# Patient Record
Sex: FEMALE | Race: WHITE | NOT HISPANIC OR LATINO | URBAN - METROPOLITAN AREA
[De-identification: names, ages, dates, MRNs, and addresses within clinical notes are randomized per-mention and may not be internally consistent; named-entity substitution may affect disease eponyms.]

---

## 2021-03-03 ENCOUNTER — INPATIENT (INPATIENT)
Facility: HOSPITAL | Age: 66
LOS: 8 days | Discharge: NURSING, REHAB OR LONG-TERM CARE | End: 2021-03-12
Attending: STUDENT IN AN ORGANIZED HEALTH CARE EDUCATION/TRAINING PROGRAM | Admitting: STUDENT IN AN ORGANIZED HEALTH CARE EDUCATION/TRAINING PROGRAM
Payer: COMMERCIAL

## 2021-03-03 VITALS
OXYGEN SATURATION: 98 % | RESPIRATION RATE: 16 BRPM | SYSTOLIC BLOOD PRESSURE: 158 MMHG | HEART RATE: 124 BPM | DIASTOLIC BLOOD PRESSURE: 108 MMHG | TEMPERATURE: 98 F

## 2021-03-03 DIAGNOSIS — R53.1 WEAKNESS: ICD-10-CM

## 2021-03-03 DIAGNOSIS — I10 ESSENTIAL (PRIMARY) HYPERTENSION: ICD-10-CM

## 2021-03-03 DIAGNOSIS — E66.9 OBESITY, UNSPECIFIED: ICD-10-CM

## 2021-03-03 DIAGNOSIS — F32.9 MAJOR DEPRESSIVE DISORDER, SINGLE EPISODE, UNSPECIFIED: ICD-10-CM

## 2021-03-03 DIAGNOSIS — F10.10 ALCOHOL ABUSE, UNCOMPLICATED: ICD-10-CM

## 2021-03-03 DIAGNOSIS — D53.9 NUTRITIONAL ANEMIA, UNSPECIFIED: ICD-10-CM

## 2021-03-03 DIAGNOSIS — F41.9 ANXIETY DISORDER, UNSPECIFIED: ICD-10-CM

## 2021-03-03 DIAGNOSIS — Z02.9 ENCOUNTER FOR ADMINISTRATIVE EXAMINATIONS, UNSPECIFIED: ICD-10-CM

## 2021-03-03 LAB
ALBUMIN SERPL ELPH-MCNC: 3 G/DL — LOW (ref 3.3–5)
ALP SERPL-CCNC: 93 U/L — SIGNIFICANT CHANGE UP (ref 40–120)
ALT FLD-CCNC: 11 U/L — SIGNIFICANT CHANGE UP (ref 4–33)
ANION GAP SERPL CALC-SCNC: 14 MMOL/L — SIGNIFICANT CHANGE UP (ref 7–14)
AST SERPL-CCNC: 21 U/L — SIGNIFICANT CHANGE UP (ref 4–32)
BASOPHILS # BLD AUTO: 0.05 K/UL — SIGNIFICANT CHANGE UP (ref 0–0.2)
BASOPHILS NFR BLD AUTO: 0.5 % — SIGNIFICANT CHANGE UP (ref 0–2)
BILIRUB SERPL-MCNC: 0.3 MG/DL — SIGNIFICANT CHANGE UP (ref 0.2–1.2)
BUN SERPL-MCNC: 12 MG/DL — SIGNIFICANT CHANGE UP (ref 7–23)
CALCIUM SERPL-MCNC: 9.1 MG/DL — SIGNIFICANT CHANGE UP (ref 8.4–10.5)
CHLORIDE SERPL-SCNC: 103 MMOL/L — SIGNIFICANT CHANGE UP (ref 98–107)
CO2 SERPL-SCNC: 25 MMOL/L — SIGNIFICANT CHANGE UP (ref 22–31)
CREAT SERPL-MCNC: 0.49 MG/DL — LOW (ref 0.5–1.3)
CRP SERPL-MCNC: 40.5 MG/L — HIGH
EOSINOPHIL # BLD AUTO: 0.15 K/UL — SIGNIFICANT CHANGE UP (ref 0–0.5)
EOSINOPHIL NFR BLD AUTO: 1.5 % — SIGNIFICANT CHANGE UP (ref 0–6)
ERYTHROCYTE [SEDIMENTATION RATE] IN BLOOD: 77 MM/HR — HIGH (ref 4–25)
GLUCOSE SERPL-MCNC: 96 MG/DL — SIGNIFICANT CHANGE UP (ref 70–99)
HCT VFR BLD CALC: 40.8 % — SIGNIFICANT CHANGE UP (ref 34.5–45)
HGB BLD-MCNC: 13.3 G/DL — SIGNIFICANT CHANGE UP (ref 11.5–15.5)
IANC: 6.55 K/UL — SIGNIFICANT CHANGE UP (ref 1.5–8.5)
IMM GRANULOCYTES NFR BLD AUTO: 0.3 % — SIGNIFICANT CHANGE UP (ref 0–1.5)
LYMPHOCYTES # BLD AUTO: 2.87 K/UL — SIGNIFICANT CHANGE UP (ref 1–3.3)
LYMPHOCYTES # BLD AUTO: 28 % — SIGNIFICANT CHANGE UP (ref 13–44)
MCHC RBC-ENTMCNC: 32.6 GM/DL — SIGNIFICANT CHANGE UP (ref 32–36)
MCHC RBC-ENTMCNC: 35.4 PG — HIGH (ref 27–34)
MCV RBC AUTO: 108.5 FL — HIGH (ref 80–100)
MONOCYTES # BLD AUTO: 0.6 K/UL — SIGNIFICANT CHANGE UP (ref 0–0.9)
MONOCYTES NFR BLD AUTO: 5.9 % — SIGNIFICANT CHANGE UP (ref 2–14)
NEUTROPHILS # BLD AUTO: 6.55 K/UL — SIGNIFICANT CHANGE UP (ref 1.8–7.4)
NEUTROPHILS NFR BLD AUTO: 63.8 % — SIGNIFICANT CHANGE UP (ref 43–77)
NRBC # BLD: 0 /100 WBCS — SIGNIFICANT CHANGE UP
NRBC # FLD: 0 K/UL — SIGNIFICANT CHANGE UP
PLATELET # BLD AUTO: 413 K/UL — HIGH (ref 150–400)
POTASSIUM SERPL-MCNC: 3.2 MMOL/L — LOW (ref 3.5–5.3)
POTASSIUM SERPL-SCNC: 3.2 MMOL/L — LOW (ref 3.5–5.3)
PROT SERPL-MCNC: 6.5 G/DL — SIGNIFICANT CHANGE UP (ref 6–8.3)
RBC # BLD: 3.76 M/UL — LOW (ref 3.8–5.2)
RBC # FLD: 16.7 % — HIGH (ref 10.3–14.5)
SARS-COV-2 RNA SPEC QL NAA+PROBE: SIGNIFICANT CHANGE UP
SODIUM SERPL-SCNC: 142 MMOL/L — SIGNIFICANT CHANGE UP (ref 135–145)
TSH SERPL-MCNC: 2.1 UIU/ML — SIGNIFICANT CHANGE UP (ref 0.27–4.2)
VIT B12 SERPL-MCNC: 260 PG/ML — SIGNIFICANT CHANGE UP (ref 200–900)
WBC # BLD: 10.25 K/UL — SIGNIFICANT CHANGE UP (ref 3.8–10.5)
WBC # FLD AUTO: 10.25 K/UL — SIGNIFICANT CHANGE UP (ref 3.8–10.5)

## 2021-03-03 PROCEDURE — 99285 EMERGENCY DEPT VISIT HI MDM: CPT

## 2021-03-03 PROCEDURE — 99223 1ST HOSP IP/OBS HIGH 75: CPT | Mod: GC

## 2021-03-03 RX ORDER — SODIUM CHLORIDE 9 MG/ML
1000 INJECTION, SOLUTION INTRAVENOUS ONCE
Refills: 0 | Status: COMPLETED | OUTPATIENT
Start: 2021-03-03 | End: 2021-03-03

## 2021-03-03 RX ORDER — ENOXAPARIN SODIUM 100 MG/ML
40 INJECTION SUBCUTANEOUS DAILY
Refills: 0 | Status: DISCONTINUED | OUTPATIENT
Start: 2021-03-03 | End: 2021-03-09

## 2021-03-03 RX ORDER — THIAMINE MONONITRATE (VIT B1) 100 MG
100 TABLET ORAL ONCE
Refills: 0 | Status: COMPLETED | OUTPATIENT
Start: 2021-03-03 | End: 2021-03-03

## 2021-03-03 RX ORDER — FOLIC ACID 0.8 MG
1 TABLET ORAL ONCE
Refills: 0 | Status: COMPLETED | OUTPATIENT
Start: 2021-03-03 | End: 2021-03-03

## 2021-03-03 RX ORDER — LANOLIN ALCOHOL/MO/W.PET/CERES
6 CREAM (GRAM) TOPICAL AT BEDTIME
Refills: 0 | Status: DISCONTINUED | OUTPATIENT
Start: 2021-03-03 | End: 2021-03-12

## 2021-03-03 RX ORDER — FLUOXETINE HCL 10 MG
1 CAPSULE ORAL
Qty: 0 | Refills: 0 | DISCHARGE

## 2021-03-03 RX ORDER — THIAMINE MONONITRATE (VIT B1) 100 MG
100 TABLET ORAL DAILY
Refills: 0 | Status: DISCONTINUED | OUTPATIENT
Start: 2021-03-03 | End: 2021-03-03

## 2021-03-03 RX ORDER — THIAMINE MONONITRATE (VIT B1) 100 MG
500 TABLET ORAL THREE TIMES A DAY
Refills: 0 | Status: COMPLETED | OUTPATIENT
Start: 2021-03-03 | End: 2021-03-06

## 2021-03-03 RX ORDER — LANOLIN ALCOHOL/MO/W.PET/CERES
5 CREAM (GRAM) TOPICAL AT BEDTIME
Refills: 0 | Status: DISCONTINUED | OUTPATIENT
Start: 2021-03-03 | End: 2021-03-03

## 2021-03-03 RX ORDER — POTASSIUM CHLORIDE 20 MEQ
40 PACKET (EA) ORAL ONCE
Refills: 0 | Status: COMPLETED | OUTPATIENT
Start: 2021-03-03 | End: 2021-03-03

## 2021-03-03 RX ORDER — FLUOXETINE HCL 10 MG
20 CAPSULE ORAL DAILY
Refills: 0 | Status: DISCONTINUED | OUTPATIENT
Start: 2021-03-03 | End: 2021-03-12

## 2021-03-03 RX ORDER — LANOLIN ALCOHOL/MO/W.PET/CERES
1 CREAM (GRAM) TOPICAL
Qty: 0 | Refills: 0 | DISCHARGE

## 2021-03-03 RX ORDER — INFLUENZA VIRUS VACCINE 15; 15; 15; 15 UG/.5ML; UG/.5ML; UG/.5ML; UG/.5ML
0.5 SUSPENSION INTRAMUSCULAR ONCE
Refills: 0 | Status: DISCONTINUED | OUTPATIENT
Start: 2021-03-03 | End: 2021-03-12

## 2021-03-03 RX ADMIN — SODIUM CHLORIDE 1000 MILLILITER(S): 9 INJECTION, SOLUTION INTRAVENOUS at 16:46

## 2021-03-03 RX ADMIN — Medication 1 TABLET(S): at 13:56

## 2021-03-03 RX ADMIN — Medication 100 MILLIGRAM(S): at 13:56

## 2021-03-03 RX ADMIN — Medication 105 MILLIGRAM(S): at 22:22

## 2021-03-03 RX ADMIN — Medication 40 MILLIEQUIVALENT(S): at 19:25

## 2021-03-03 RX ADMIN — SODIUM CHLORIDE 500 MILLILITER(S): 9 INJECTION, SOLUTION INTRAVENOUS at 16:46

## 2021-03-03 RX ADMIN — Medication 6 MILLIGRAM(S): at 22:22

## 2021-03-03 RX ADMIN — SODIUM CHLORIDE 1000 MILLILITER(S): 9 INJECTION, SOLUTION INTRAVENOUS at 13:56

## 2021-03-03 RX ADMIN — Medication 1 MILLIGRAM(S): at 13:56

## 2021-03-03 NOTE — H&P ADULT - HISTORY OF PRESENT ILLNESS
***************************************************************  Nette Jaime, PGY1  Internal Medicine   pager: NS: [phone] [facility]: [phone]  ***************************************************************    FABRIZIO, SERGEI  65y  Female      Patient is a 65y old  Female who presents with a chief complaint of leg stiffness    HPI:  65 year old female with ETOH use disorder, depression p/w weakness. Bilateral lower extremity weakness started 2 months ago, proximal > distal, equal bilaterally, no numbess or tingling and denies imbalance. She had a fall 2 months ago when she was drinking and went to a rehab facility. Her last drink was 6 weeks ago. She denies f/c SOB, back pain, abdominal pain, increased urinary frequency. She endorses some bowel incontinence which has since resolved. Recently she has been requiring a wheelchair. She denies headache, blurry vision, neck or back pain, UE weakness or numbness, f/c, cough, SOB.     ED Course:    ***************************************************************  Nette Jaime, PGY1  Internal Medicine   pager: NS: [phone] [facility]: [phone]  ***************************************************************    SERGEI DINH  65y  Female      Patient is a 65y old  Female who presents with a chief complaint of leg stiffness    HPI:  65 year old female with ETOH use disorder, depression p/w weakness. Bilateral lower extremity weakness started 2 months ago, proximal > distal, equal bilaterally, no numbess or tingling and denies imbalance or muscular pain. She had a fall 2 months ago when she was drinking and went to a rehab facility voluntarily two weeks ago for anxiety, depression and alcohol use disorder. Her last drink was 5 weeks ago. this morning, she checked herself out of rehab because she was not able to participate in the activities due to LE weakness and the rehab dropped her in the ED. She denies f/c SOB, back pain, abdominal pain, increased urinary frequency. She endorses some bowel incontinence which has since resolved. Recently she has been requiring a wheelchair. She denies headache, blurry vision, neck or back pain, UE weakness or numbness, f/c, cough, SOB.     ED Course:    ***************************************************************  Nette Jaime, PGY1  Internal Medicine   pager: NS: [phone] [facility]: [id number]  ***************************************************************    SERGEI DINH  65y  Female      Patient is a 65y old  Female who presents with a chief complaint of leg stiffness    HPI:  65 year old female with ETOH use disorder, depression p/w weakness. Bilateral lower extremity weakness started 2 months ago, proximal > distal, equal bilaterally, no numbess or tingling and denies imbalance or muscular pain. She had a fall 2 months ago when she was drinking and went to a rehab facility voluntarily two weeks ago for anxiety, depression and alcohol use disorder. Her last drink was 5 weeks ago. this morning, she checked herself out of rehab because she was not able to participate in the activities due to LE weakness and the rehab dropped her in the ED. She denies f/c SOB, back pain, abdominal pain, increased urinary frequency. She endorses some bowel incontinence which has since resolved. Recently she has been requiring a wheelchair. She denies headache, blurry vision, neck or back pain, UE weakness or numbness, f/c, cough, SOB.     ED Course:   VS: afebrile, , -170/, RR 16, SpO2 100% RA  s/p 1L LR, thiamine 100mg, multivitamin, folate 1 mg ***************************************************************  Nette Jaime, PGY1  Internal Medicine   pager: NS: [phone] [facility]: [phone]  ***************************************************************    SERGEI DINH  65y  Female      Patient is a 65y old  Female who presents with a chief complaint of leg stiffness    HPI:  65 year old female with ETOH use disorder, depression p/w weakness. Bilateral lower extremity weakness started 2 months ago, proximal > distal, equal bilaterally, no numbess or tingling and denies imbalance or muscular pain. She had a fall 2 months ago when she was drinking and went to a rehab facility voluntarily two weeks ago for anxiety, depression and alcohol use disorder. Her last drink was 5 weeks ago. this morning, she checked herself out of rehab because she was not able to participate in the activities due to LE weakness and the rehab dropped her in the ED. She denies f/c SOB, back pain, abdominal pain, increased urinary frequency. She endorses some bowel incontinence which has since resolved. Recently she has been requiring a wheelchair. She denies headache, blurry vision, neck or back pain, UE weakness or numbness, f/c, cough, SOB.     ED Course:   VS: afebrile, , -170/, RR 16, SpO2 100% RA  s/p 2L LR, thiamine 100mg, multivitamin, folate 1 mg

## 2021-03-03 NOTE — H&P ADULT - NSHPLABSRESULTS_GEN_ALL_CORE
LABS:      The Labs were reviewed by me   The Radiology was reviewed by me    EKG tracing reviewed by me    03-03    142  |  103  |  12  ----------------------------<  96  3.2<L>   |  25  |  0.49<L>    Ca    9.1      03 Mar 2021 13:58    TPro  6.5  /  Alb  3.0<L>  /  TBili  0.3  /  DBili  x   /  AST  21  /  ALT  11  /  AlkPhos  93  03-03                                                  13.3   10.25 )-----------( 413      ( 03 Mar 2021 13:58 )             40.8     CAPILLARY BLOOD GLUCOSE                RADIOLOGY & ADDITIONAL TESTS:    Imaging Personally Reviewed:  [ ] YES  [ ] NO

## 2021-03-03 NOTE — ED ADULT NURSE NOTE - NSIMPLEMENTINTERV_GEN_ALL_ED
Implemented All Fall Risk Interventions:  [place] to call system. Call bell, personal items and telephone within reach. Instruct patient to call for assistance. Room bathroom lighting operational. Non-slip footwear when patient is off stretcher. Physically safe environment: no spills, clutter or unnecessary equipment. Stretcher in lowest position, wheels locked, appropriate side rails in place. Provide visual cue, wrist band, yellow gown, etc. Monitor gait and stability. Monitor for mental status changes and reorient to person, place, and time. Review medications for side effects contributing to fall risk. Reinforce activity limits and safety measures with patient and family.

## 2021-03-03 NOTE — H&P ADULT - PROBLEM SELECTOR PLAN 1
Bilateral LE weakness started 2 months ago, prox > distal, equal bilaterally, no numbness or tingling and no imbalance.  - f/u B12, folate, B1  - neuro consult ? Bilateral LE weakness started 2 months ago, prox > distal, equal bilaterally, no numbness or tingling and no imbalance.   - B12 and TSH normal, CRP (40) and ESR (77) elevated  - f/u folate, B1  - neuro consult ? rheum consult Bilateral LE weakness started 2 months ago, prox > distal, equal bilaterally, no numbness or tingling and no imbalance.   - B12 and TSH normal, CRP (40) and ESR (77) elevated  - f/u folate, B1  - c/w thiamine 100 mg daily  - PT consult Bilateral LE weakness started 2 months ago, prox > distal, equal bilaterally, no numbness or tingling and no imbalance.   - B12 and TSH normal, CRP (40) and ESR (77) elevated  - f/u folate  - start thiamine 500 mg TID x 3 days then 100 mg daily  - PT consult  - neuro consult in am Bilateral LE weakness started 2 months ago, prox > distal, equal bilaterally, no numbness or tingling and no imbalance.   - B12, CK and TSH normal, CRP (40) and ESR (77) elevated  - f/u folate  - start thiamine 500 mg TID x 3 days then 100 mg daily  - PT consult  - neuro consult in am Bilateral LE weakness started 2 months ago, prox > distal, equal bilaterally, no numbness or tingling and no imbalance.   - B12, CK and TSH normal, CRP (40) and ESR (77) elevated  - f/u folate  - start thiamine 500 mg TID x 3 days then 100 mg daily, multivitamin  - PT consult  - neuro consult in am

## 2021-03-03 NOTE — H&P ADULT - NSHPSOCIALHISTORY_GEN_ALL_CORE
Lives alone. Has a brother who lives close. Denies smoking, IVDU. Started drinking 1 bottle of white wine 5-6 years ago. Lives alone. Has a brother who lives close. Denies smoking, IVDU. Started drinking 1 bottle of white wine 5-6 years ago. Works as a , now from home.

## 2021-03-03 NOTE — H&P ADULT - PROBLEM SELECTOR PLAN 7
Diet: DASH/TLC  Dispo: pending PT eval  DVT ppx: lovenox BMI ?   - f/u lipids, A1c  - compression stockings for LE edema

## 2021-03-03 NOTE — H&P ADULT - NSHPREVIEWOFSYSTEMS_GEN_ALL_CORE
REVIEW OF SYSTEMS:  CONSTITUTIONAL: No fever, weight loss, or fatigue  EYES: No eye pain, visual disturbances, or discharge  ENT:  No difficulty hearing, tinnitus, vertigo; No sinus or throat pain  NECK: No pain or stiffness  BREASTS: No pain, masses, or nipple discharge  RESPIRATORY: No cough, wheezing, chills or hemoptysis; No shortness of breath  CARDIOVASCULAR: No chest pain, palpitations, dizziness, or leg swelling  GASTROINTESTINAL: No abdominal or epigastric pain. No nausea, vomiting, or hematemesis; No diarrhea or constipation. No melena or hematochezia.  GENITOURINARY: No dysuria, frequency, hematuria, or incontinence  NEUROLOGICAL: No headaches, memory loss, loss of strength, numbness, or tremors  SKIN: No itching, burning, rashes, or lesions   LYMPH NODES: No enlarged glands  ENDOCRINE: No heat or cold intolerance; No hair loss  MUSCULOSKELETAL: No joint pain or swelling; No muscle, back, or extremity pain  PSYCHIATRIC: No depression, anxiety, mood swings, or difficulty sleeping  HEME/LYMPH: No easy bruising, or bleeding gums  ALLERGY AND IMMUNOLOGIC: No hives or eczema REVIEW OF SYSTEMS:  CONSTITUTIONAL: No fever, weight loss, or fatigue  EYES: No eye pain, visual disturbances, or discharge  ENT:  No difficulty hearing, tinnitus, vertigo; No sinus or throat pain  NECK: No pain or stiffness  BREASTS: No pain, masses, or nipple discharge  RESPIRATORY: No cough, wheezing, chills or hemoptysis; No shortness of breath  CARDIOVASCULAR: No chest pain, palpitations, dizziness, or leg swelling  GASTROINTESTINAL: (+) diarrhea. No abdominal or epigastric pain. No nausea, vomiting, or hematemesis; No diarrhea or constipation. No melena or hematochezia.  GENITOURINARY: No dysuria, frequency, hematuria, or incontinence  NEUROLOGICAL: No headaches, memory loss, loss of strength, numbness, or tremors  SKIN: No itching, burning, rashes, or lesions   LYMPH NODES: No enlarged glands  ENDOCRINE: No heat or cold intolerance; No hair loss  MUSCULOSKELETAL: (+) LE weakness bilaterally. No joint pain or swelling; No muscle, back, or extremity pain  PSYCHIATRIC: No depression, anxiety, mood swings, or difficulty sleeping  HEME/LYMPH: No easy bruising, or bleeding gums  ALLERGY AND IMMUNOLOGIC: No hives or eczema REVIEW OF SYSTEMS:  CONSTITUTIONAL: No fever, weight loss, or fatigue  EYES: No eye pain, visual disturbances, or discharge  ENT:  No difficulty hearing, tinnitus, vertigo; No sinus or throat pain  NECK: No pain or stiffness  BREASTS: No pain, masses, or nipple discharge  RESPIRATORY: No cough, wheezing, chills or hemoptysis; No shortness of breath  CARDIOVASCULAR: No chest pain, palpitations, dizziness, or leg swelling  GASTROINTESTINAL: (+) diarrhea, poor appetite. No abdominal or epigastric pain. No nausea, vomiting, or hematemesis; No diarrhea or constipation. No melena or hematochezia.  GENITOURINARY: No dysuria, frequency, hematuria, or incontinence  NEUROLOGICAL: No headaches, memory loss, loss of strength, numbness, or tremors  SKIN: No itching, burning, rashes, or lesions   LYMPH NODES: No enlarged glands  ENDOCRINE: No heat or cold intolerance; No hair loss  MUSCULOSKELETAL: (+) LE weakness bilaterally. No joint pain or swelling; No muscle, back, or extremity pain  PSYCHIATRIC: No depression, anxiety, mood swings, or difficulty sleeping  HEME/LYMPH: No easy bruising, or bleeding gums  ALLERGY AND IMMUNOLOGIC: No hives or eczema

## 2021-03-03 NOTE — ED PROVIDER NOTE - PROGRESS NOTE DETAILS
MYA Olivarez (Resident) - ESR, crp elevated. CK normal. Labs also showing macrocytic anemia w/ normal B12 levels. Likely folate deficient. d/w hospitalist, pt is unsafe at home and will require placement into acute rehab.

## 2021-03-03 NOTE — H&P ADULT - ASSESSMENT
65 year old female with ETOH use disorder, depression p/w weakness likely 2/2 vitamin deficiency vs infectious vs neuromuscular disease. 65 year old female with ETOH use disorder, depression p/w weakness likely 2/2 vitamin deficiency vs infectious vs neuromuscular disease vs autoimmune.

## 2021-03-03 NOTE — H&P ADULT - NSHPPHYSICALEXAM_GEN_ALL_CORE
T(C): 36.4 (03-03-21 @ 15:23), Max: 36.5 (03-03-21 @ 12:22)  HR: 115 (03-03-21 @ 15:23) (115 - 124)  BP: 160/93 (03-03-21 @ 15:23) (158/108 - 171/99)  RR: 16 (03-03-21 @ 15:23) (16 - 16)  SpO2: 100% (03-03-21 @ 15:23) (98% - 100%)  Wt(kg): --Vital Signs Last 24 Hrs  T(C): 36.4 (03 Mar 2021 15:23), Max: 36.5 (03 Mar 2021 12:22)  T(F): 97.6 (03 Mar 2021 15:23), Max: 97.7 (03 Mar 2021 12:22)  HR: 115 (03 Mar 2021 15:23) (115 - 124)  BP: 160/93 (03 Mar 2021 15:23) (158/108 - 171/99)  BP(mean): --  RR: 16 (03 Mar 2021 15:23) (16 - 16)  SpO2: 100% (03 Mar 2021 15:23) (98% - 100%)    PHYSICAL EXAM:  GENERAL: NAD, well-groomed, well-developed  HEAD:  Atraumatic, Normocephalic  EYES: EOMI, PERRLA, conjunctiva and sclera clear  ENMT: No tonsillar erythema, exudates, or enlargement; Moist mucous membranes, Good dentition, No lesions  NECK: Supple, No JVD, Normal thyroid  NERVOUS SYSTEM:  Alert & Oriented X3, Good concentration; Motor Strength 5/5 B/L upper and lower extremities; DTRs 2+ intact and symmetric  CHEST/LUNG: Clear to percussion bilaterally; No rales, rhonchi, wheezing, or rubs  HEART: Regular rate and rhythm; No murmurs, rubs, or gallops  ABDOMEN: Soft, Nontender, Nondistended; Bowel sounds present  EXTREMITIES:  2+ Peripheral Pulses, No clubbing, cyanosis, or edema  LYMPH: No lymphadenopathy noted  SKIN: No rashes or lesions    Consultant(s) Notes Reviewed:  [x ] YES  [ ] NO  Care Discussed with Consultants/Other Providers [ x] YES  [ ] NO T(C): 36.4 (03-03-21 @ 15:23), Max: 36.5 (03-03-21 @ 12:22)  HR: 115 (03-03-21 @ 15:23) (115 - 124)  BP: 160/93 (03-03-21 @ 15:23) (158/108 - 171/99)  RR: 16 (03-03-21 @ 15:23) (16 - 16)  SpO2: 100% (03-03-21 @ 15:23) (98% - 100%)  Wt(kg): --Vital Signs Last 24 Hrs  T(C): 36.4 (03 Mar 2021 15:23), Max: 36.5 (03 Mar 2021 12:22)  T(F): 97.6 (03 Mar 2021 15:23), Max: 97.7 (03 Mar 2021 12:22)  HR: 115 (03 Mar 2021 15:23) (115 - 124)  BP: 160/93 (03 Mar 2021 15:23) (158/108 - 171/99)  BP(mean): --  RR: 16 (03 Mar 2021 15:23) (16 - 16)  SpO2: 100% (03 Mar 2021 15:23) (98% - 100%)    PHYSICAL EXAM:  GENERAL: NAD, well-groomed, well-developed  HEAD:  Atraumatic, Normocephalic  EYES: EOMI, PERRLA, conjunctiva and sclera clear  ENMT: No tonsillar erythema, exudates, or enlargement; Moist mucous membranes, Good dentition, No lesions  NECK: Supple, No JVD, Normal thyroid  NERVOUS SYSTEM:  Alert & Oriented X3, Good concentration; Motor Strength 5/5 B/L upper. Lower extremities hip flexors 1/5, knee flexors 2/5, ankle flexors 5/5  CHEST/LUNG: Clear to percussion bilaterally; No rales, rhonchi, wheezing, or rubs  HEART: Regular rate and rhythm; No murmurs, rubs, or gallops  ABDOMEN: Soft, Nontender, Nondistended; Bowel sounds present  EXTREMITIES:  2+ Peripheral Pulses, No clubbing, cyanosis, or edema  LYMPH: No lymphadenopathy noted  SKIN: No rashes or lesions    Consultant(s) Notes Reviewed:  [x ] YES  [ ] NO  Care Discussed with Consultants/Other Providers [ x] YES  [ ] NO T(C): 36.4 (03-03-21 @ 15:23), Max: 36.5 (03-03-21 @ 12:22)  HR: 115 (03-03-21 @ 15:23) (115 - 124)  BP: 160/93 (03-03-21 @ 15:23) (158/108 - 171/99)  RR: 16 (03-03-21 @ 15:23) (16 - 16)  SpO2: 100% (03-03-21 @ 15:23) (98% - 100%)  Wt(kg): --Vital Signs Last 24 Hrs  T(C): 36.4 (03 Mar 2021 15:23), Max: 36.5 (03 Mar 2021 12:22)  T(F): 97.6 (03 Mar 2021 15:23), Max: 97.7 (03 Mar 2021 12:22)  HR: 115 (03 Mar 2021 15:23) (115 - 124)  BP: 160/93 (03 Mar 2021 15:23) (158/108 - 171/99)  BP(mean): --  RR: 16 (03 Mar 2021 15:23) (16 - 16)  SpO2: 100% (03 Mar 2021 15:23) (98% - 100%)    PHYSICAL EXAM:  GENERAL: NAD, well-groomed, well-developed  HEAD:  Atraumatic, Normocephalic  EYES: (+) xanthelasma. EOMI, PERRLA, conjunctiva and sclera clear.  ENMT: No tonsillar erythema, exudates, or enlargement; Moist mucous membranes, Good dentition, No lesions  NECK: Supple, No JVD, Normal thyroid  NERVOUS SYSTEM:  Alert & Oriented X3, Good concentration; Motor Strength 5/5 B/L upper. Lower extremities hip flexors 1/5, knee flexors 2/5, ankle flexors 5/5  CHEST/LUNG: Clear to percussion bilaterally; No rales, rhonchi, wheezing, or rubs  HEART: Regular rate and rhythm; No murmurs, rubs, or gallops  ABDOMEN: Soft, Nontender, Nondistended; Bowel sounds present  EXTREMITIES:  2+ Peripheral Pulses, No clubbing, cyanosis, or edema  LYMPH: No lymphadenopathy noted  SKIN: No rashes or lesions    Consultant(s) Notes Reviewed:  [x ] YES  [ ] NO  Care Discussed with Consultants/Other Providers [ x] YES  [ ] NO T(C): 36.4 (03-03-21 @ 15:23), Max: 36.5 (03-03-21 @ 12:22)  HR: 115 (03-03-21 @ 15:23) (115 - 124)  BP: 160/93 (03-03-21 @ 15:23) (158/108 - 171/99)  RR: 16 (03-03-21 @ 15:23) (16 - 16)  SpO2: 100% (03-03-21 @ 15:23) (98% - 100%)  Wt(kg): --Vital Signs Last 24 Hrs  T(C): 36.4 (03 Mar 2021 15:23), Max: 36.5 (03 Mar 2021 12:22)  T(F): 97.6 (03 Mar 2021 15:23), Max: 97.7 (03 Mar 2021 12:22)  HR: 115 (03 Mar 2021 15:23) (115 - 124)  BP: 160/93 (03 Mar 2021 15:23) (158/108 - 171/99)  BP(mean): --  RR: 16 (03 Mar 2021 15:23) (16 - 16)  SpO2: 100% (03 Mar 2021 15:23) (98% - 100%)    PHYSICAL EXAM:  GENERAL: NAD, well-groomed, well-developed  HEAD:  Atraumatic, Normocephalic  EYES: (+) xanthelasma. EOMI, PERRLA, conjunctiva and sclera clear.  ENMT: No tonsillar erythema, exudates, or enlargement; Moist mucous membranes, Good dentition, No lesions  NECK: Supple, No JVD, Normal thyroid  NERVOUS SYSTEM:  Alert & Oriented X3, Good concentration; Motor Strength 5/5 B/L upper. Lower extremities hip flexors 1/5, knee flexors 2/5, ankle flexors 5/5.  Sensation decreased R LE distally>proximally  CHEST/LUNG: Clear to percussion bilaterally; No rales, rhonchi, wheezing, or rubs  HEART: Regular rate and rhythm; No murmurs, rubs, or gallops  ABDOMEN: Soft, Nontender, Nondistended; Bowel sounds present  EXTREMITIES:  2+ Peripheral Pulses, No clubbing, cyanosis, or edema  LYMPH: No lymphadenopathy noted  SKIN: No rashes or lesions    Consultant(s) Notes Reviewed:  [x ] YES  [ ] NO  Care Discussed with Consultants/Other Providers [ x] YES  [ ] NO

## 2021-03-03 NOTE — ED ADULT TRIAGE NOTE - CHIEF COMPLAINT QUOTE
pt c/o b/l leg "stiffness" pt having difficulty ambulating due to stiffness x a few weeks. denies any pain, sob. Pt also reports feeling depressed/ anxious, pt crying in triage. As per family pt has been having episodes of incontinence, drinking excessive amounts of alcohol. denies any SI/HI.

## 2021-03-03 NOTE — H&P ADULT - NSHPRISKHIVSCREEN_GEN_ALL_CORE
Problem: Respiratory Impairment - Respiratory Therapy 253  Intervention: Inhaled medication delivery  Intervention Status  Done  Intervention: Inhaled gas administration  Intervention Status  Done  Intervention: Respiratory support devices  Intervention Status  Done    Goal: Demonstrates optimal level of respiratory function 0192  Outcome: Outcome Not Met, Continue to Monitor  Pt is on CPAP +9 overnight and 2-4LNC daily.  TRACY RICHEYD.       Offered and patient declined

## 2021-03-03 NOTE — ED PROVIDER NOTE - NS ED ROS FT
Constitutional: no fevers, chills  HEENT: no cough, rhinorrhea  Cardiac: no chest pain, palpitations  Respiratory: no SOB  GI: no n/v, abd pain, bloody or dark stools  : no dysuria, frequency, or hematuria  MSK: no joint pain  Skin: no rashes  Neuro: no headache, change in vision, +weakness  Psych: +depression

## 2021-03-03 NOTE — ED PROVIDER NOTE - OBJECTIVE STATEMENT
66yo F w/ etoh abuse, depression and otherwise unknown hx 2/2 not seeking healthcare comes to ED w/ weakness. Complaining of weakness of bilateral lower extremities x2 months, equal, proximal>distal, non-progressive, no numbness or tingling, no loss of balance. Had fallen 2 months ago when she was drinking a lot. Went to unstructured rehab facility. Last drink 6 weeks ago. Denies f/c, cp, sob, back pain, abdominal pain, or issues with urination. Did have some brief bowel incontinence which has since resolved. 64yo F w/ etoh abuse, depression and otherwise unknown hx 2/2 not seeking healthcare comes to ED w/ weakness. Complaining of weakness of bilateral lower extremities x2 months, equal, proximal>distal, non-progressive, no numbness or tingling, no loss of balance. Had fallen 2 months ago when she was drinking a lot. Went to unstructured rehab facility. Last drink 6 weeks ago. Denies f/c, cp, sob, back pain, abdominal pain, or issues with urination. Did have some brief bowel incontinence which has since resolved.  Attending - Agree with above.  I evaluated patient myself. 66 y/o F with PMH including AUD and MDD.  reports has been at detox/rehab x 2 weeks.  Has been having increasing weakness of b/l lower extremities x 2 months.  Recently requiring use of wheelchair.  Has not been seen by a doctor for this weakness per patient.  Left rehab today as told unable to stay since non-ambulatory, so came to ED for eval.  Denies headache, blurry vision, neck pain, or back pain.  Denies upper extremity numbness or weakness.  Denies fever, cough, covid.

## 2021-03-03 NOTE — ED PROVIDER NOTE - CLINICAL SUMMARY MEDICAL DECISION MAKING FREE TEXT BOX
Pt w/ 2 months of proximal muscle weakness of lower extremities. Tachy but otherwise VSS. Exam w/o focal findings or hyperreflexia. Concern for vitamin deficiencies, anemia, myositis. Since bowel incontinance has since resolved and pt not reporting f/c or back pain or urinary symptoms, unlikely cord compression. Labs, CK, ESR, CRP, EKG, LR, thiamine, folate, MV. Reassess.

## 2021-03-03 NOTE — H&P ADULT - PROBLEM SELECTOR PLAN 6
BMI ?   - f/u lipids, A1c  - compression stockings for LE edema .5 on admission, likely 2/2 liver disease due to alcohol use d/o  - normal B12, f/u folate

## 2021-03-03 NOTE — ED ADULT NURSE NOTE - OBJECTIVE STATEMENT
Patient alert and awake, oriented x4, breathing with ease on room air, skin intact, lives alone. Patient reports being discharged from "[facility]" from [place] today due to immobility since rehab only takes patients who can walk. Patient reports being able to take several steps with walker. Patient reports legs giving out in December 2020 and have fallen multiple time since and was in the rehab. Patient wants to go back to rehab and family told her to come to ED for her bilateral leg stiffness and weakness to be readmitted to rehab. Patient denies pains, chest pains, chills, fevers, n/v/d. Patient reports feeling very anxious, used to drink alcohol (wine) daily, currently in program to quit drinking and has not drank for 5 weeks.     Facilitator RN, will give report to primary RN. Patient alert and awake, oriented x4, breathing with ease on room air, skin intact, lives alone. Patient reports being discharged from "[facility]" for her depression, anxiety and alcohol abuse from [place] today due to immobility since rehab only takes patients who can walk. Patient reports being able to take several steps with walker. Patient reports legs giving out in December 2020 and have fallen multiple time since and was in the rehab. Patient wants to go back to rehab and family told her to come to ED for her bilateral leg stiffness and weakness to be readmitted to rehab. Patient denies pains, chest pains, chills, fevers, n/v/d. Patient reports feeling very anxious, used to drink alcohol (wine) daily, currently in program to quit drinking and has not drank for 5 weeks.     Facilitator RN, will give report to primary RN.

## 2021-03-03 NOTE — H&P ADULT - PROBLEM SELECTOR PLAN 4
Last drink was 5 weeks ago. Started drinking 5-6 yrs ago when mother was sick. She drinks 1 bottle of white wine daily. She checked herself into rehab two weeks ago.   - WILLWA triggered ativan

## 2021-03-03 NOTE — H&P ADULT - PROBLEM SELECTOR PLAN 5
At admission, BP 170s/100s. .   - Start ? At admission, BP 170s/100s. .   - Reassess whether to start BP meds tomorrow

## 2021-03-03 NOTE — ED PROVIDER NOTE - PHYSICAL EXAMINATION
General: non-toxic, NAD  HEENT: NCAT, PERRL, EOMI, -nystagmus  Cardiac: tachy but regular, no murmurs, 2+ peripheral pulses  Chest: CTA  Abdomen: soft, non-distended, bowel sounds present, no ttp, no rebound or guarding  Extremities: no peripheral edema, calf tenderness, or leg size discrepancies  Skin: no rashes  Neuro: AAOx4, CNs intact, 5+motor but decreased bilaterla hipflexion when standing, sensory grossly intact, no dysmetria, sluggish gait, no pronator drift, intact balance w/ eyes closed.  Psych: mood and affect appropriate General: non-toxic, NAD  HEENT: NCAT, PERRL, EOMI, -nystagmus  Cardiac: tachy but regular, no murmurs, 2+ peripheral pulses  Chest: CTA  Abdomen: soft, non-distended, bowel sounds present, no ttp, no rebound or guarding  Extremities: no peripheral edema, calf tenderness, or leg size discrepancies  Skin: no rashes  Neuro: AAOx4, CNs intact, 5+motor but decreased bilaterla hipflexion when standing, sensory grossly intact, no dysmetria, sluggish gait, no pronator drift, intact balance w/ eyes closed.  Psych: mood and affect appropriate  ATTENDING PHYSICAL EXAM  GEN - NAD; well appearing; A+O x3  HEAD - NC/AT; EYES/NOSE - PERRL, EOMI, mucous membranes moist, no discharge; THROAT: Oral cavity and pharynx normal. No inflammation, swelling, exudate, or lesions  NECK: Neck supple, non-tender without lymphadenopathy, no masses, no JVD  PULMONARY - CTA b/l, symmetric breath sounds, no w/r/r  CARDIAC -s1s2, tachy, no M,R,G  ABDOMEN - +NABS, ND, NT, soft, no guarding, no rebound, no masses   BACK - no CVA tenderness, No vertebral or paravertebral tenderness  EXTREMITIES - symmetric pulses, 2+ dp, capillary refill < 2 seconds, no clubbing, no cyanosis, no edema  SKIN - no rash or bruising   NEUROLOGIC - alert, CN 2-12 intact, Decreased hip flexion motor function b/l, 3-4/5.  Upper extremity motor normal.  No sensory deficits.  Slow gait.

## 2021-03-04 DIAGNOSIS — L60.9 NAIL DISORDER, UNSPECIFIED: ICD-10-CM

## 2021-03-04 LAB
A1C WITH ESTIMATED AVERAGE GLUCOSE RESULT: 5.6 % — SIGNIFICANT CHANGE UP (ref 4–5.6)
ANION GAP SERPL CALC-SCNC: 10 MMOL/L — SIGNIFICANT CHANGE UP (ref 7–14)
BUN SERPL-MCNC: 8 MG/DL — SIGNIFICANT CHANGE UP (ref 7–23)
CALCIUM SERPL-MCNC: 8.3 MG/DL — LOW (ref 8.4–10.5)
CHLORIDE SERPL-SCNC: 106 MMOL/L — SIGNIFICANT CHANGE UP (ref 98–107)
CHOLEST SERPL-MCNC: 135 MG/DL — SIGNIFICANT CHANGE UP
CO2 SERPL-SCNC: 25 MMOL/L — SIGNIFICANT CHANGE UP (ref 22–31)
CREAT SERPL-MCNC: 0.44 MG/DL — LOW (ref 0.5–1.3)
ESTIMATED AVERAGE GLUCOSE: 114 MG/DL — SIGNIFICANT CHANGE UP (ref 68–114)
GLUCOSE SERPL-MCNC: 77 MG/DL — SIGNIFICANT CHANGE UP (ref 70–99)
HCT VFR BLD CALC: 33.5 % — LOW (ref 34.5–45)
HCV AB S/CO SERPL IA: 0.1 S/CO — SIGNIFICANT CHANGE UP (ref 0–0.99)
HCV AB SERPL-IMP: SIGNIFICANT CHANGE UP
HDLC SERPL-MCNC: 58 MG/DL — SIGNIFICANT CHANGE UP
HGB BLD-MCNC: 10.8 G/DL — LOW (ref 11.5–15.5)
LIPID PNL WITH DIRECT LDL SERPL: 63 MG/DL — SIGNIFICANT CHANGE UP
MAGNESIUM SERPL-MCNC: 1.5 MG/DL — LOW (ref 1.6–2.6)
MCHC RBC-ENTMCNC: 32.2 GM/DL — SIGNIFICANT CHANGE UP (ref 32–36)
MCHC RBC-ENTMCNC: 35.3 PG — HIGH (ref 27–34)
MCV RBC AUTO: 109.5 FL — HIGH (ref 80–100)
NON HDL CHOLESTEROL: 77 MG/DL — SIGNIFICANT CHANGE UP
NRBC # BLD: 0 /100 WBCS — SIGNIFICANT CHANGE UP
NRBC # FLD: 0 K/UL — SIGNIFICANT CHANGE UP
PHOSPHATE SERPL-MCNC: 3 MG/DL — SIGNIFICANT CHANGE UP (ref 2.5–4.5)
PLATELET # BLD AUTO: 316 K/UL — SIGNIFICANT CHANGE UP (ref 150–400)
POTASSIUM SERPL-MCNC: 3.4 MMOL/L — LOW (ref 3.5–5.3)
POTASSIUM SERPL-SCNC: 3.4 MMOL/L — LOW (ref 3.5–5.3)
RBC # BLD: 3.06 M/UL — LOW (ref 3.8–5.2)
RBC # FLD: 17 % — HIGH (ref 10.3–14.5)
SODIUM SERPL-SCNC: 141 MMOL/L — SIGNIFICANT CHANGE UP (ref 135–145)
TRIGL SERPL-MCNC: 69 MG/DL — SIGNIFICANT CHANGE UP
WBC # BLD: 7.01 K/UL — SIGNIFICANT CHANGE UP (ref 3.8–10.5)
WBC # FLD AUTO: 7.01 K/UL — SIGNIFICANT CHANGE UP (ref 3.8–10.5)

## 2021-03-04 PROCEDURE — 99223 1ST HOSP IP/OBS HIGH 75: CPT | Mod: GC

## 2021-03-04 PROCEDURE — 99233 SBSQ HOSP IP/OBS HIGH 50: CPT | Mod: GC

## 2021-03-04 RX ADMIN — Medication 105 MILLIGRAM(S): at 21:25

## 2021-03-04 RX ADMIN — ENOXAPARIN SODIUM 40 MILLIGRAM(S): 100 INJECTION SUBCUTANEOUS at 11:56

## 2021-03-04 RX ADMIN — Medication 105 MILLIGRAM(S): at 05:37

## 2021-03-04 RX ADMIN — Medication 6 MILLIGRAM(S): at 21:25

## 2021-03-04 RX ADMIN — Medication 1 TABLET(S): at 11:56

## 2021-03-04 RX ADMIN — Medication 105 MILLIGRAM(S): at 13:30

## 2021-03-04 RX ADMIN — Medication 20 MILLIGRAM(S): at 13:30

## 2021-03-04 NOTE — PROGRESS NOTE ADULT - SUBJECTIVE AND OBJECTIVE BOX
***************************************************************  Nette Jaime, PGY1  Internal Medicine   pager: NS: [phone] [facility]: [phone]  ***************************************************************    PROGRESS NOTE:     Patient is a 65y old  Female who presents with a chief complaint of Bilateral LE weakness (proximal > distal) (03 Mar 2021 17:38)      SUBJECTIVE / OVERNIGHT EVENTS:      MEDICATIONS  (STANDING):  enoxaparin Injectable 40 milliGRAM(s) SubCutaneous daily  FLUoxetine 20 milliGRAM(s) Oral daily  influenza   Vaccine 0.5 milliLiter(s) IntraMuscular once  melatonin 6 milliGRAM(s) Oral at bedtime  multivitamin 1 Tablet(s) Oral daily  thiamine IVPB 500 milliGRAM(s) IV Intermittent three times a day    MEDICATIONS  (PRN):      CAPILLARY BLOOD GLUCOSE        I&O's Summary      PHYSICAL EXAM:  Vital Signs Last 24 Hrs  T(C): 36.7 (04 Mar 2021 05:34), Max: 36.7 (04 Mar 2021 05:34)  T(F): 98 (04 Mar 2021 05:34), Max: 98 (04 Mar 2021 05:34)  HR: 99 (04 Mar 2021 05:34) (99 - 124)  BP: 125/84 (04 Mar 2021 05:34) (125/84 - 171/99)  BP(mean): --  RR: 17 (04 Mar 2021 05:34) (16 - 18)  SpO2: 100% (04 Mar 2021 05:34) (98% - 100%)      PHYSICAL EXAM:  GENERAL: NAD, well-groomed, well-developed  HEAD:  Atraumatic, Normocephalic  EYES: (+) xanthelasma. EOMI, PERRLA, conjunctiva and sclera clear.  ENMT: No tonsillar erythema, exudates, or enlargement; Moist mucous membranes, Good dentition, No lesions  NECK: Supple, No JVD, Normal thyroid  NERVOUS SYSTEM:  Alert & Oriented X3, Good concentration; Motor Strength 5/5 B/L upper. Lower extremities hip flexors 1/5, knee flexors 2/5, ankle flexors 5/5.  Sensation decreased R LE distally>proximally  CHEST/LUNG: Clear to percussion bilaterally; No rales, rhonchi, wheezing, or rubs  HEART: Regular rate and rhythm; No murmurs, rubs, or gallops  ABDOMEN: Soft, Nontender, Nondistended; Bowel sounds present  EXTREMITIES:  2+ Peripheral Pulses, No clubbing, cyanosis, or edema  LYMPH: No lymphadenopathy noted  SKIN: No rashes or lesions    LABS:                        13.3   10.25 )-----------( 413      ( 03 Mar 2021 13:58 )             40.8     03-03    142  |  103  |  12  ----------------------------<  96  3.2<L>   |  25  |  0.49<L>    Ca    9.1      03 Mar 2021 13:58    TPro  6.5  /  Alb  3.0<L>  /  TBili  0.3  /  DBili  x   /  AST  21  /  ALT  11  /  AlkPhos  93  03-03      CARDIAC MARKERS ( 03 Mar 2021 14:15 )  x     / x     / 43 U/L / x     / x                RADIOLOGY & ADDITIONAL TESTS:  Results Reviewed:   Imaging Personally Reviewed:  Electrocardiogram Personally Reviewed:    COORDINATION OF CARE:  Care Discussed with Consultants/Other Providers [Y/N]:  Prior or Outpatient Records Reviewed [Y/N]:   ***************************************************************  Nette Jaime, PGY1  Internal Medicine   pager: NS: [phone] [facility]: [phone]  ***************************************************************    PROGRESS NOTE:     Patient is a 65y old  Female who presents with a chief complaint of Bilateral LE weakness (proximal > distal) (03 Mar 2021 17:38)      SUBJECTIVE / OVERNIGHT EVENTS:  No acute events overnight. Patient has improved hip flexor strength and knee flexor strength this morning. She denies SOB, CP, abdominal pain, muscle pain. Podiatry was consulted for toenail care. Neurology was consulted for LE weakness. Planning for discharge tomorrow to rehab facility.    MEDICATIONS  (STANDING):  enoxaparin Injectable 40 milliGRAM(s) SubCutaneous daily  FLUoxetine 20 milliGRAM(s) Oral daily  influenza   Vaccine 0.5 milliLiter(s) IntraMuscular once  melatonin 6 milliGRAM(s) Oral at bedtime  multivitamin 1 Tablet(s) Oral daily  thiamine IVPB 500 milliGRAM(s) IV Intermittent three times a day    MEDICATIONS  (PRN):      CAPILLARY BLOOD GLUCOSE        I&O's Summary      PHYSICAL EXAM:  Vital Signs Last 24 Hrs  T(C): 36.7 (04 Mar 2021 05:34), Max: 36.7 (04 Mar 2021 05:34)  T(F): 98 (04 Mar 2021 05:34), Max: 98 (04 Mar 2021 05:34)  HR: 99 (04 Mar 2021 05:34) (99 - 124)  BP: 125/84 (04 Mar 2021 05:34) (125/84 - 171/99)  BP(mean): --  RR: 17 (04 Mar 2021 05:34) (16 - 18)  SpO2: 100% (04 Mar 2021 05:34) (98% - 100%)      PHYSICAL EXAM:  GENERAL: NAD, well-groomed, well-developed  HEAD:  Atraumatic, Normocephalic  EYES: (+) xanthelasma. EOMI, PERRLA, conjunctiva and sclera clear.  ENMT: No tonsillar erythema, exudates, or enlargement; Moist mucous membranes, Good dentition, No lesions  NECK: Supple, No JVD, Normal thyroid  NERVOUS SYSTEM:  Alert & Oriented X3, Good concentration; Motor Strength 5/5 B/L upper. Lower extremities hip flexors 1/5, knee flexors 2/5, ankle flexors 5/5.  Sensation decreased R LE distally>proximally  CHEST/LUNG: Clear to percussion bilaterally; No rales, rhonchi, wheezing, or rubs  HEART: Regular rate and rhythm; No murmurs, rubs, or gallops  ABDOMEN: Soft, Nontender, Nondistended; Bowel sounds present  EXTREMITIES:  2+ Peripheral Pulses, No clubbing, cyanosis, or edema  LYMPH: No lymphadenopathy noted  SKIN: No rashes or lesions    LABS:                        13.3   10.25 )-----------( 413      ( 03 Mar 2021 13:58 )             40.8     03-03    142  |  103  |  12  ----------------------------<  96  3.2<L>   |  25  |  0.49<L>    Ca    9.1      03 Mar 2021 13:58    TPro  6.5  /  Alb  3.0<L>  /  TBili  0.3  /  DBili  x   /  AST  21  /  ALT  11  /  AlkPhos  93  03-03      CARDIAC MARKERS ( 03 Mar 2021 14:15 )  x     / x     / 43 U/L / x     / x                RADIOLOGY & ADDITIONAL TESTS:  Results Reviewed:   Imaging Personally Reviewed:  Electrocardiogram Personally Reviewed:    COORDINATION OF CARE:  Care Discussed with Consultants/Other Providers [Y/N]:  Prior or Outpatient Records Reviewed [Y/N]:   ***************************************************************  Nette Jaime, PGY1  Internal Medicine   pager: NS: [phone] [facility]: [phone]  ***************************************************************    PROGRESS NOTE:     Patient is a 65y old  Female who presents with a chief complaint of Bilateral LE weakness (proximal > distal) (03 Mar 2021 17:38)      SUBJECTIVE / OVERNIGHT EVENTS:  No acute events overnight. Patient has improved hip flexor strength and knee flexor strength this morning. She denies SOB, CP, abdominal pain, muscle pain. Podiatry was consulted for toenail care. Neurology was consulted for LE weakness. Planning for discharge tomorrow to rehab facility.    MEDICATIONS  (STANDING):  enoxaparin Injectable 40 milliGRAM(s) SubCutaneous daily  FLUoxetine 20 milliGRAM(s) Oral daily  influenza   Vaccine 0.5 milliLiter(s) IntraMuscular once  melatonin 6 milliGRAM(s) Oral at bedtime  multivitamin 1 Tablet(s) Oral daily  thiamine IVPB 500 milliGRAM(s) IV Intermittent three times a day    MEDICATIONS  (PRN):      CAPILLARY BLOOD GLUCOSE        I&O's Summary      PHYSICAL EXAM:  Vital Signs Last 24 Hrs  T(C): 36.7 (04 Mar 2021 05:34), Max: 36.7 (04 Mar 2021 05:34)  T(F): 98 (04 Mar 2021 05:34), Max: 98 (04 Mar 2021 05:34)  HR: 99 (04 Mar 2021 05:34) (99 - 124)  BP: 125/84 (04 Mar 2021 05:34) (125/84 - 171/99)  BP(mean): --  RR: 17 (04 Mar 2021 05:34) (16 - 18)  SpO2: 100% (04 Mar 2021 05:34) (98% - 100%)      PHYSICAL EXAM:  GENERAL: NAD, well-groomed, well-developed  HEAD:  Atraumatic, Normocephalic  EYES: (+) xanthelasma. EOMI, PERRLA, conjunctiva and sclera clear.  ENMT: No tonsillar erythema, exudates, or enlargement; Moist mucous membranes, Good dentition, No lesions  NECK: Supple, No JVD, Normal thyroid  NERVOUS SYSTEM:  Alert & Oriented X3, Good concentration; Motor Strength 5/5 B/L upper. Lower extremities hip flexors 1/5, knee flexors 2/5, ankle flexors 5/5.  Sensation decreased R LE distally>proximally  CHEST/LUNG: Clear to percussion bilaterally; No rales, rhonchi, wheezing, or rubs  HEART: Regular rate and rhythm; No murmurs, rubs, or gallops  ABDOMEN: Soft, Nontender, Nondistended; Bowel sounds present  EXTREMITIES:  2+ Peripheral Pulses, No clubbing, cyanosis, or edema  LYMPH: No lymphadenopathy noted  SKIN: No rashes or lesions    LABS:                        13.3   10.25 )-----------( 413      ( 03 Mar 2021 13:58 )             40.8     03-03    142  |  103  |  12  ----------------------------<  96  3.2<L>   |  25  |  0.49<L>    Ca    9.1      03 Mar 2021 13:58    TPro  6.5  /  Alb  3.0<L>  /  TBili  0.3  /  DBili  x   /  AST  21  /  ALT  11  /  AlkPhos  93  03-03      CARDIAC MARKERS ( 03 Mar 2021 14:15 )  x     / x     / 43 U/L / x     / x                RADIOLOGY & ADDITIONAL TESTS:  Results Reviewed:   Imaging Personally Reviewed:  Electrocardiogram Personally Reviewed:    COORDINATION OF CARE:  Care Discussed with Consultants/Other Providers [Y/N]: [facility] neuro Dr. jones, additional labs/imaging ordered  Prior or Outpatient Records Reviewed [Y/N]:

## 2021-03-04 NOTE — PROGRESS NOTE ADULT - PROBLEM SELECTOR PLAN 8
Diet: DASH/TLC  Dispo: pending PT eval  DVT ppx: lovenox Diet: DASH/TLC  Dispo: PT eval: rehab facility  DVT ppx: lovenox

## 2021-03-04 NOTE — PROGRESS NOTE ADULT - PROBLEM SELECTOR PLAN 1
Bilateral LE weakness started 2 months ago, prox > distal, equal bilaterally, no numbness or tingling and no imbalance.   - B12, CK and TSH normal, CRP (40) and ESR (77) elevated  - f/u folate  - start thiamine 500 mg TID x 3 days then 100 mg daily, multivitamin  - PT consult  - neuro consult in am Bilateral LE weakness started 2 months ago, prox > distal, equal bilaterally, no numbness or tingling and no imbalance.   - B12, CK and TSH normal, CRP (40) and ESR (77) elevated  - f/u folate  - start thiamine 500 mg TID x 3 days then 100 mg daily, multivitamin  - PT consult  - neuro consulted: apprec recs

## 2021-03-04 NOTE — PHYSICAL THERAPY INITIAL EVALUATION ADULT - PERTINENT HX OF CURRENT PROBLEM, REHAB EVAL
Patient is 65 year old female admitted with history of ETOH use disorder, depression p/w weakness. Bilateral lower extremity weakness started 2 months ago, proximal > distal, equal bilaterally, no numbness or tingling and denies imbalance or muscular pain

## 2021-03-04 NOTE — PROGRESS NOTE ADULT - PROBLEM SELECTOR PLAN 7
BMI ?   - f/u lipids, A1c  - compression stockings for LE edema BMI ?   - A1c 5.6  - f/u lipids  - compression stockings for LE edema

## 2021-03-04 NOTE — CONSULT NOTE ADULT - ATTENDING COMMENTS
Patient seen and examined with neurology team and above note reviewed and I agree with assessment and plan as outlined. Patient hx as noted and has been having 2-3 months of progressive weakness and stiffness in legs and difficulty ambulating. She has had a few falls as a result of her weakness and stiffness in her legs. She denies any urine or fecal incontinence or numbness in her groin however she has tingling sensation in her feet bilaterally.  Exam shows weakness in her deltoids bilaterally, right triceps 4/5 and right hand  as well. Proximal legs 4/5 along with decreased reflexes at knees but 1+ in ankles.  She has diminished vibration from knees to toes bilaterally and intact proprioception.  Finger to nose was intact bilaterally  She uses a walker to ambulate.    Labs reviewed and  B12 was very low.    Assessment and plan : 65 year old woman with progressive weakness in legs over the past 2-3 months with difficulty ambulating   DDX: nutritional deficiency vs myopathy vs cervical myelopathy vs inflammatory or less likely autoimmune process.     1. Await MRI brain and MRI cervical spine with contrast     2. Blood work as outlined above including inflammatory and autoimmune workup and CPK, homocysteine and MMA etc..    3. PT and OT     4. Continue medical mgt and supportive care and all questions answered

## 2021-03-04 NOTE — PROGRESS NOTE ADULT - PROBLEM SELECTOR PLAN 6
.5 on admission, likely 2/2 liver disease due to alcohol use d/o  - normal B12, f/u folate Feet have abnormal toenail growth  - podiatry consulted

## 2021-03-04 NOTE — PROGRESS NOTE ADULT - ASSESSMENT
65 year old female with ETOH use disorder, depression p/w weakness likely 2/2 vitamin deficiency vs infectious vs neuromuscular disease vs autoimmune.

## 2021-03-04 NOTE — PHYSICAL THERAPY INITIAL EVALUATION ADULT - PLANNED THERAPY INTERVENTIONS, PT EVAL
stairs training/balance training/bed mobility training/gait training/strengthening/transfer training

## 2021-03-04 NOTE — PROGRESS NOTE ADULT - PROBLEM SELECTOR PLAN 5
At admission, BP 170s/100s. .   - Reassess whether to start BP meds tomorrow .5 on admission, likely 2/2 liver disease due to alcohol use d/o  - normal B12, f/u folate

## 2021-03-04 NOTE — CONSULT NOTE ADULT - SUBJECTIVE AND OBJECTIVE BOX
SERGEI DINH  Female  MRN-[id number]    HPI:  65F w/ PMH of etoh use disorder, depression presents w/ progressive weakness for 2 months.     Patient was walking normally on Christmas 2020. Shortly thereafter she endorses progressively worsening weakness in her legs now requiring a walker as of two weeks ago. States she has increased her drinking recently and her diet has been poor. Most difficult activities include going up stairs and getting up out of a chair. Denies any difficulty opening cans or buttoning buttons. Denies any rash, fever, chills, weight loss, night sweats. No pain. No numbness/tingling. Has had several falls but this was after the weakness began. Denies any acuity in onset of weakness, more progressive. No bowel or bladder dysfunction. No FH of neurological disorders. No bulbar symptoms such as diplopia or dysarthria.     ED Course:   VS: afebrile, , -170/, RR 16, SpO2 100% RA  s/p 2L LR, thiamine 100mg, multivitamin, folate 1 mg (03 Mar 2021 17:38)    Workup thus far:   Hgb 10.8; .5   B12 260 (lower end of normal)     NIHSS:  MRS:     ROS: All negative except as mentioned in HPI    PAST MEDICAL & SURGICAL HISTORY:  Major depression    History of alcohol use disorder    Anxiety      MEDICATIONS  (STANDING):  enoxaparin Injectable 40 milliGRAM(s) SubCutaneous daily  FLUoxetine 20 milliGRAM(s) Oral daily  influenza   Vaccine 0.5 milliLiter(s) IntraMuscular once  melatonin 6 milliGRAM(s) Oral at bedtime  multivitamin 1 Tablet(s) Oral daily  thiamine IVPB 500 milliGRAM(s) IV Intermittent three times a day    MEDICATIONS  (PRN):    Allergies    No Known Drug Allergies  shellfish (Rash)  strawberry (Rash)    Intolerances        VITAL SIGNS:  T(F): 98  HR: 99  BP: 125/84  RR: 17  SpO2: 100%    Exam:   MS: Oriented x3. Fluent. Follows crossed commands.   CN: VFF. EOMI. V1-V3 intact. Face symmetric. T/u midline.     Shoulder Abduction (C5): R 4/5 --- L 4/5  Elbow flexion (C5/C6): R 4+ --- L 4+  Elbow extension(C7): R 4+ --- L 4+    Wrist extension(C6): R 5/5 --- L 5/5     Wrist flexion (C8): R 5/5 --- L 5/5    Hip flexion(L1/L2): R 5/5 --- L 5/5                    Knee flexion(S1): R 5/5 --- L 5/5                   Knee extension(L3/L4): R 5/5 --- L 5/5           Dorsiflexion(L4): R 5/5 --- L 5/5               Plantarflexion(S1/S2): R 5/5 --- L 5/5            Sensory: Intact to LT throughout   Reflexes: 2+ throughout. Babinski absent bilaterally.   Coordination: No dysmetria on FNF or ataxia on HTS bilaterally   Gait: Deferred    LABS:                          10.8   7.01  )-----------( 316      ( 04 Mar 2021 07:12 )             33.5     03-04    141  |  106  |  8   ----------------------------<  77  3.4<L>   |  25  |  0.44<L>    Ca    8.3<L>      04 Mar 2021 07:12  Phos  3.0     03-04  Mg     1.5     03-04    TPro  6.5  /  Alb  3.0<L>  /  TBili  0.3  /  DBili  x   /  AST  21  /  ALT  11  /  AlkPhos  93  03-03        RADIOLOGY & ADDITIONAL STUDIES:             SERGEI DINH  Female  MRN-[id number]    HPI:  65F w/ PMH of etoh use disorder, depression presents w/ progressive weakness for 2 months.     Patient was walking normally on Christmas 2020. Shortly thereafter she endorses progressively worsening weakness in her legs now requiring a walker as of two weeks ago. States she has increased her drinking recently and her diet has been poor. Most difficult activities include going up stairs and getting up out of a chair. Denies any difficulty opening cans or buttoning buttons. Denies any rash, fever, chills, weight loss, night sweats. No pain. No numbness/tingling. Has had several falls but this was after the weakness began. Denies any acuity in onset of weakness, more progressive. No bowel or bladder dysfunction. No FH of neurological disorders. No bulbar symptoms such as diplopia or dysarthria.     ED Course:   VS: afebrile, , -170/, RR 16, SpO2 100% RA  s/p 2L LR, thiamine 100mg, multivitamin, folate 1 mg (03 Mar 2021 17:38)    Workup thus far:   Hgb 10.8; .5   B12 260 (lower end of normal)     NIHSS:  MRS:     ROS: All negative except as mentioned in HPI    PAST MEDICAL & SURGICAL HISTORY:  Major depression    History of alcohol use disorder    Anxiety      MEDICATIONS  (STANDING):  enoxaparin Injectable 40 milliGRAM(s) SubCutaneous daily  FLUoxetine 20 milliGRAM(s) Oral daily  influenza   Vaccine 0.5 milliLiter(s) IntraMuscular once  melatonin 6 milliGRAM(s) Oral at bedtime  multivitamin 1 Tablet(s) Oral daily  thiamine IVPB 500 milliGRAM(s) IV Intermittent three times a day    MEDICATIONS  (PRN):    Allergies    No Known Drug Allergies  shellfish (Rash)  strawberry (Rash)    Intolerances        VITAL SIGNS:  T(F): 98  HR: 99  BP: 125/84  RR: 17  SpO2: 100%    Exam:   MS: Oriented x3. Fluent. Follows crossed commands.   CN: VFF. EOMI. V1-V3 intact. Face symmetric. T/u midline.     Motor: Normal tone; No atrophy.     Shoulder Abduction (C5): R 4/5 --- L 4/5  Elbow flexion (C5/C6): R 4+ --- L 4+  Elbow extension(C7): R 4+ --- L 4+    Wrist extension(C6): R 5/5 --- L 5/5     Wrist flexion (C8): R 5/5 --- L 5/5    Hip flexion(L1/L2): R 4/5 --- L 4/5                    Knee flexion(S1): R 5/5 --- L 5/5                   Knee extension(L3/L4): R 5/5 --- L 5/5           Dorsiflexion(L4): R 5/5 --- L 5/5               Plantarflexion(S1/S2): R 5/5 --- L 5/5            Sensory: Intact to LT throughout   Reflexes: 3+ R. bi, tri, brachioradialis, 2+ L. bi, tri, brachioradialis, 0 patellas, 1 ankles. Babinski absent bilaterally.   Coordination: No dysmetria on FNF or ataxia on HTS bilaterally   Gait: Deferred    LABS:                          10.8   7.01  )-----------( 316      ( 04 Mar 2021 07:12 )             33.5     03-04    141  |  106  |  8   ----------------------------<  77  3.4<L>   |  25  |  0.44<L>    Ca    8.3<L>      04 Mar 2021 07:12  Phos  3.0     03-04  Mg     1.5     03-04    TPro  6.5  /  Alb  3.0<L>  /  TBili  0.3  /  DBili  x   /  AST  21  /  ALT  11  /  AlkPhos  93  03-03        RADIOLOGY & ADDITIONAL STUDIES:             SERGEI DINH  Female  MRN-[id number]    HPI:  65F w/ PMH of etoh use disorder, depression presents w/ progressive weakness for 2 months.     Patient was walking normally on Christmas 2020. Shortly thereafter she endorses progressively worsening weakness in her legs now requiring a walker as of two weeks ago. States she has increased her drinking recently and her diet has been poor. Most difficult activities include going up stairs and getting up out of a chair. Denies any difficulty opening cans or buttoning buttons. Denies any rash, fever, chills, weight loss, night sweats. No pain. No numbness/tingling. Has had several falls but this was after the weakness began. Denies any acuity in onset of weakness, more progressive. No bowel or bladder dysfunction. No FH of neurological disorders. No bulbar symptoms such as diplopia or dysarthria.     ED Course:   VS: afebrile, , -170/, RR 16, SpO2 100% RA  s/p 2L LR, thiamine 100mg, multivitamin, folate 1 mg (03 Mar 2021 17:38)    Workup thus far:   Hgb 10.8; .5   B12 260 (lower end of normal)     NIHSS:  MRS:     ROS: All negative except as mentioned in HPI    PAST MEDICAL & SURGICAL HISTORY:  Major depression    History of alcohol use disorder    Anxiety      MEDICATIONS  (STANDING):  enoxaparin Injectable 40 milliGRAM(s) SubCutaneous daily  FLUoxetine 20 milliGRAM(s) Oral daily  influenza   Vaccine 0.5 milliLiter(s) IntraMuscular once  melatonin 6 milliGRAM(s) Oral at bedtime  multivitamin 1 Tablet(s) Oral daily  thiamine IVPB 500 milliGRAM(s) IV Intermittent three times a day    MEDICATIONS  (PRN):    Allergies    No Known Drug Allergies  shellfish (Rash)  strawberry (Rash)    Intolerances        VITAL SIGNS:  T(F): 98  HR: 99  BP: 125/84  RR: 17  SpO2: 100%    Exam:   MS: Oriented x3. Fluent. Follows crossed commands.   CN: VFF. EOMI. V1-V3 intact. Face symmetric. T/u midline.     Motor: Normal tone; No atrophy.     Shoulder Abduction (C5): R 4/5 --- L 4/5  Elbow flexion (C5/C6): R 4+ --- L 4+  Elbow extension(C7): R 4+ --- L 4+    Wrist extension(C6): R 5/5 --- L 5/5     Wrist flexion (C8): R 5/5 --- L 5/5    Hip flexion(L1/L2): R 4/5 --- L 4/5                    Knee flexion(S1): R 5/5 --- L 5/5                   Knee extension(L3/L4): R 5/5 --- L 5/5           Dorsiflexion(L4): R 5/5 --- L 5/5               Plantarflexion(S1/S2): R 5/5 --- L 5/5            Sensory: Decreased vibration up to the ILSC on the left, up to the knee on the right. Mildly decreased JPS at the toes bilaterally. Intact to LT and PP throughout   Reflexes: 3+ R. bi, tri, brachioradialis, 2+ L. bi, tri, brachioradialis, 0 patellas, 1 ankles. Babinski absent bilaterally.   Coordination: No dysmetria on FNF or ataxia on HTS bilaterally   Gait: Deferred    LABS:                          10.8   7.01  )-----------( 316      ( 04 Mar 2021 07:12 )             33.5     03-04    141  |  106  |  8   ----------------------------<  77  3.4<L>   |  25  |  0.44<L>    Ca    8.3<L>      04 Mar 2021 07:12  Phos  3.0     03-04  Mg     1.5     03-04    TPro  6.5  /  Alb  3.0<L>  /  TBili  0.3  /  DBili  x   /  AST  21  /  ALT  11  /  AlkPhos  93  03-03        RADIOLOGY & ADDITIONAL STUDIES:
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
## 2021-03-04 NOTE — PROGRESS NOTE ADULT - ASSESSMENT
65 year old female with painful, ingrowing toenails  -aseptic debridement of ingrowing nails, pain relieved s/p debridement  -no open lesions or local signs of infection   -Pedal education   -no other acute podiatric issues  -please reconsult as needed    Follow up with Dr. Hector ARDON within 1 month of discharge.  Call for appointment   [place] office: [phone]
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
## 2021-03-04 NOTE — CONSULT NOTE ADULT - ASSESSMENT
65F w/ PMH of etoh use disorder, depression presents w/ progressive weakness for 2 months. Exam demonstrates proximal patttern weakness w/ length dependent neuropathy. B12 low.     Impression: Progressive proximal pattern weakness w/ decreased vibration sensation in a length dependent pattern likely localizing to the spinal cord, peripheral nerves, less likely NMJ, or multifocal. Etiology uncertain, possibilities include subacute combined degeneration (i.e B12 deficiency), spondylotic cervical myelopathy, MG.     Plan:   [] MR brain w/w/o; MR c spine w/w/o   [] ESR, CRP, B12 level, folate level, methylmalonic acid and homocysteine, rheum factor, CCP, TSH, syphilis serology, MERRILL, Zn++, Cu++, SPEP, Fe, TIBC  [] Start high dose treatment for B12 deficiency   [] Myasthenia Ab (Anti Musk, AchR ab, AchBinding ab, Ach blocking ab)   [] MERRILL, ANCA, Lupus ab, Sjogren ab, ACE    65F w/ PMH of etoh use disorder, depression presents w/ progressive weakness for 2 months. Exam demonstrates proximal patttern weakness w/ length dependent neuropathy. B12 low.     Impression: Progressive proximal pattern weakness w/ decreased vibration sensation in a length dependent pattern likely localizing to the spinal cord, peripheral nerves, less likely NMJ, or multifocal. Etiology uncertain, possibilities include subacute combined degeneration (i.e B12 deficiency), spondylotic cervical myelopathy, MG, Polymyalgia Rheumatica?.     Plan:   [] MR brain w/w/o; MR c spine w/w/o   [] ESR, CRP, B12 level, folate level, methylmalonic acid and homocysteine, rheum factor, CCP, TSH, syphilis serology, MERRILL, Zn++, Cu++, SPEP, Fe, TIBC  [] Start high dose treatment for B12 deficiency   [] Myasthenia Ab (Anti Musk, AchR ab, AchBinding ab, Ach blocking ab)   [] MERRILL, ANCA, Lupus ab, Sjogren ab, ACE   [] Rheumatology consult given ESR/CRP and proximal stiffness   65F w/ PMH of etoh use disorder, depression presents w/ progressive weakness for 2 months. Exam demonstrates proximal patttern weakness w/ length dependent neuropathy. B12 low.     Impression: Progressive proximal pattern weakness w/ decreased vibration sensation in a length dependent pattern likely localizing to the spinal cord, peripheral nerves, less likely NMJ, or multifocal. Etiology uncertain, possibilities include subacute combined degeneration (i.e B12 deficiency), spondylotic cervical myelopathy, MG, Polymyalgia Rheumatica?.     Plan:   [] MR brain w/w/o; MR c spine w/w/o   [] ESR, CRP, B12 level, folate level, methylmalonic acid and homocysteine, rheum factor, CCP, TSH, syphilis serology, MERRILL, Zn++, Cu++, SPEP, Fe, TIBC  [] Start high dose treatment for B12 deficiency (please draw MMA and Homocysteine before); Continue Thiamine treatment   [] Myasthenia Ab (Anti Musk, AchR ab, AchBinding ab, Ach blocking ab)   [] MERRILL, ANCA, Lupus ab, Sjogren ab, ACE   [] Rheumatology consult given ESR/CRP and proximal stiffness  [] PT/OT  65F w/ PMH of etoh use disorder, depression presents w/ progressive weakness for 2 months. Exam demonstrates proximal patttern weakness w/ length dependent neuropathy. B12 low.     Impression: Progressive proximal pattern weakness w/ decreased vibration sensation in a length dependent pattern likely localizing to the spinal cord, peripheral nerves, less likely NMJ, or multifocal. Etiology uncertain, possibilities include subacute combined degeneration (i.e B12 deficiency), spondylotic cervical myelopathy, MG, Polymyalgia Rheumatica?.     Plan:   [] MR brain w/w/o;   [] MR c spine w/w/o   [] ESR, CRP CPK level , B12 level, folate level, methylmalonic acid and homocysteine, rheum factor, CCP, TSH, syphilis serology, MERRILL, Zn++, Cu++, SPEP, Fe, TIBC  [] Start high dose treatment for B12 deficiency (please draw MMA and Homocysteine before); Continue Thiamine treatment   [] Myasthenia Ab (Anti Musk, AchR ab, AchBinding ab, Ach blocking ab)   [] MERRILL, ANCA, Lupus ab, Sjogren ab, ACE   [] Rheumatology consult given ESR/CRP and proximal stiffness  [] PT/OT

## 2021-03-04 NOTE — PROGRESS NOTE ADULT - SUBJECTIVE AND OBJECTIVE BOX
HPI:  ***************************************************************  Nette Jaime, PGY1  Internal Medicine   pager: [phone]: [phone] [facility]: [id number]  ***************************************************************    SERGEI DINH  65y  Female      Patient is a 65y old  Female who presents with a chief complaint of leg stiffness    HPI:  65 year old female with ETOH use disorder, depression p/w weakness. Bilateral lower extremity weakness started 2 months ago, proximal > distal, equal bilaterally, no numbess or tingling and denies imbalance or muscular pain. She had a fall 2 months ago when she was drinking and went to a rehab facility voluntarily two weeks ago for anxiety, depression and alcohol use disorder. Her last drink was 5 weeks ago. this morning, she checked herself out of rehab because she was not able to participate in the activities due to LE weakness and the rehab dropped her in the ED. She denies f/c SOB, back pain, abdominal pain, increased urinary frequency. She endorses some bowel incontinence which has since resolved. Recently she has been requiring a wheelchair. She denies headache, blurry vision, neck or back pain, UE weakness or numbness, f/c, cough, SOB.     ED Course:   VS: afebrile, , -170/, RR 16, SpO2 100% RA  s/p 2L LR, thiamine 100mg, multivitamin, folate 1 mg (03 Mar 2021 17:38)    Patient is a 65y old  Female who presents with a chief complaint of Bilateral LE weakness (proximal > distal) (04 Mar 2021 10:39)    Allergies    No Known Drug Allergies  shellfish (Rash)  strawberry (Rash)    Intolerances      Vital Signs Last 24 Hrs  T(C): 36.7 (04 Mar 2021 05:34), Max: 36.7 (04 Mar 2021 05:34)  T(F): 98 (04 Mar 2021 05:34), Max: 98 (04 Mar 2021 05:34)  HR: 99 (04 Mar 2021 05:34) (99 - 115)  BP: 125/84 (04 Mar 2021 05:34) (125/84 - 160/93)  BP(mean): --  RR: 17 (04 Mar 2021 05:34) (16 - 18)  SpO2: 100% (04 Mar 2021 05:34) (100% - 100%)                        10.8   7.01  )-----------( 316      ( 04 Mar 2021 07:12 )             33.5     03-04    141  |  106  |  8   ----------------------------<  77  3.4<L>   |  25  |  0.44<L>    Ca    8.3<L>      04 Mar 2021 07:12  Phos  3.0     03-04  Mg     1.5     03-04    TPro  6.5  /  Alb  3.0<L>  /  TBili  0.3  /  DBili  x   /  AST  21  /  ALT  11  /  AlkPhos  93  03-03    CAPILLARY BLOOD GLUCOSE        MEDICATIONS  (STANDING):  enoxaparin Injectable 40 milliGRAM(s) SubCutaneous daily  FLUoxetine 20 milliGRAM(s) Oral daily  influenza   Vaccine 0.5 milliLiter(s) IntraMuscular once  melatonin 6 milliGRAM(s) Oral at bedtime  multivitamin 1 Tablet(s) Oral daily  thiamine IVPB 500 milliGRAM(s) IV Intermittent three times a day    MEDICATIONS  (PRN):    PAST MEDICAL & SURGICAL HISTORY:  Major depression    History of alcohol use disorder    Anxiety      JOSE L:  Elongated, ingrowing, painful toe nails x 10  No open lesions or local signs of infection  sensation intact to b/l feet  no gross deformities  pedal pulses palp 2/4 b/l, cap fill instant to all digits

## 2021-03-05 LAB
ANION GAP SERPL CALC-SCNC: 7 MMOL/L — SIGNIFICANT CHANGE UP (ref 7–14)
BASOPHILS # BLD AUTO: 0.04 K/UL — SIGNIFICANT CHANGE UP (ref 0–0.2)
BASOPHILS NFR BLD AUTO: 0.6 % — SIGNIFICANT CHANGE UP (ref 0–2)
BUN SERPL-MCNC: 8 MG/DL — SIGNIFICANT CHANGE UP (ref 7–23)
CALCIUM SERPL-MCNC: 8.3 MG/DL — LOW (ref 8.4–10.5)
CHLORIDE SERPL-SCNC: 106 MMOL/L — SIGNIFICANT CHANGE UP (ref 98–107)
CK SERPL-CCNC: 31 U/L — SIGNIFICANT CHANGE UP (ref 25–170)
CO2 SERPL-SCNC: 25 MMOL/L — SIGNIFICANT CHANGE UP (ref 22–31)
CREAT SERPL-MCNC: 0.48 MG/DL — LOW (ref 0.5–1.3)
EOSINOPHIL # BLD AUTO: 0.24 K/UL — SIGNIFICANT CHANGE UP (ref 0–0.5)
EOSINOPHIL NFR BLD AUTO: 3.4 % — SIGNIFICANT CHANGE UP (ref 0–6)
FERRITIN SERPL-MCNC: 328 NG/ML — HIGH (ref 15–150)
GLUCOSE SERPL-MCNC: 76 MG/DL — SIGNIFICANT CHANGE UP (ref 70–99)
HCT VFR BLD CALC: 32.8 % — LOW (ref 34.5–45)
HCYS SERPL-MCNC: 81.2 UMOL/L — HIGH
HGB BLD-MCNC: 10.5 G/DL — LOW (ref 11.5–15.5)
IANC: 4.04 K/UL — SIGNIFICANT CHANGE UP (ref 1.5–8.5)
IMM GRANULOCYTES NFR BLD AUTO: 0.4 % — SIGNIFICANT CHANGE UP (ref 0–1.5)
IRON SATN MFR SERPL: 28 % — SIGNIFICANT CHANGE UP (ref 14–50)
IRON SATN MFR SERPL: 47 UG/DL — SIGNIFICANT CHANGE UP (ref 30–160)
LUPUS ANTICOAGULANT PROFILE RESULT: SIGNIFICANT CHANGE UP
LYMPHOCYTES # BLD AUTO: 2.17 K/UL — SIGNIFICANT CHANGE UP (ref 1–3.3)
LYMPHOCYTES # BLD AUTO: 30.9 % — SIGNIFICANT CHANGE UP (ref 13–44)
MAGNESIUM SERPL-MCNC: 1.6 MG/DL — SIGNIFICANT CHANGE UP (ref 1.6–2.6)
MCHC RBC-ENTMCNC: 32 GM/DL — SIGNIFICANT CHANGE UP (ref 32–36)
MCHC RBC-ENTMCNC: 35 PG — HIGH (ref 27–34)
MCV RBC AUTO: 109.3 FL — HIGH (ref 80–100)
MONOCYTES # BLD AUTO: 0.51 K/UL — SIGNIFICANT CHANGE UP (ref 0–0.9)
MONOCYTES NFR BLD AUTO: 7.3 % — SIGNIFICANT CHANGE UP (ref 2–14)
NEUTROPHILS # BLD AUTO: 4.04 K/UL — SIGNIFICANT CHANGE UP (ref 1.8–7.4)
NEUTROPHILS NFR BLD AUTO: 57.4 % — SIGNIFICANT CHANGE UP (ref 43–77)
NRBC # BLD: 0 /100 WBCS — SIGNIFICANT CHANGE UP
NRBC # FLD: 0 K/UL — SIGNIFICANT CHANGE UP
PHOSPHATE SERPL-MCNC: 3.3 MG/DL — SIGNIFICANT CHANGE UP (ref 2.5–4.5)
PLATELET # BLD AUTO: 327 K/UL — SIGNIFICANT CHANGE UP (ref 150–400)
POTASSIUM SERPL-MCNC: 3.1 MMOL/L — LOW (ref 3.5–5.3)
POTASSIUM SERPL-SCNC: 3.1 MMOL/L — LOW (ref 3.5–5.3)
PROT SERPL-MCNC: 5 G/DL — LOW (ref 6–8.3)
RBC # BLD: 3 M/UL — LOW (ref 3.8–5.2)
RBC # FLD: 16.8 % — HIGH (ref 10.3–14.5)
RHEUMATOID FACT SERPL-ACNC: <10 IU/ML — SIGNIFICANT CHANGE UP (ref 0–13)
SODIUM SERPL-SCNC: 138 MMOL/L — SIGNIFICANT CHANGE UP (ref 135–145)
T PALLIDUM AB TITR SER: NEGATIVE — SIGNIFICANT CHANGE UP
TIBC SERPL-MCNC: 168 UG/DL — LOW (ref 220–430)
TSH SERPL-MCNC: 2.24 UIU/ML — SIGNIFICANT CHANGE UP (ref 0.27–4.2)
UIBC SERPL-MCNC: 121 UG/DL — SIGNIFICANT CHANGE UP (ref 110–370)
WBC # BLD: 7.03 K/UL — SIGNIFICANT CHANGE UP (ref 3.8–10.5)
WBC # FLD AUTO: 7.03 K/UL — SIGNIFICANT CHANGE UP (ref 3.8–10.5)

## 2021-03-05 PROCEDURE — 99232 SBSQ HOSP IP/OBS MODERATE 35: CPT | Mod: GC

## 2021-03-05 PROCEDURE — 84165 PROTEIN E-PHORESIS SERUM: CPT | Mod: 26

## 2021-03-05 RX ORDER — PREGABALIN 225 MG/1
1000 CAPSULE ORAL DAILY
Refills: 0 | Status: DISCONTINUED | OUTPATIENT
Start: 2021-03-05 | End: 2021-03-12

## 2021-03-05 RX ORDER — POTASSIUM CHLORIDE 20 MEQ
20 PACKET (EA) ORAL
Refills: 0 | Status: COMPLETED | OUTPATIENT
Start: 2021-03-05 | End: 2021-03-05

## 2021-03-05 RX ORDER — SENNA PLUS 8.6 MG/1
2 TABLET ORAL AT BEDTIME
Refills: 0 | Status: DISCONTINUED | OUTPATIENT
Start: 2021-03-05 | End: 2021-03-12

## 2021-03-05 RX ORDER — MAGNESIUM SULFATE 500 MG/ML
2 VIAL (ML) INJECTION ONCE
Refills: 0 | Status: COMPLETED | OUTPATIENT
Start: 2021-03-05 | End: 2021-03-05

## 2021-03-05 RX ORDER — THIAMINE MONONITRATE (VIT B1) 100 MG
100 TABLET ORAL DAILY
Refills: 0 | Status: DISCONTINUED | OUTPATIENT
Start: 2021-03-06 | End: 2021-03-12

## 2021-03-05 RX ORDER — SENNA PLUS 8.6 MG/1
1 TABLET ORAL DAILY
Refills: 0 | Status: DISCONTINUED | OUTPATIENT
Start: 2021-03-05 | End: 2021-03-05

## 2021-03-05 RX ORDER — POLYETHYLENE GLYCOL 3350 17 G/17G
17 POWDER, FOR SOLUTION ORAL DAILY
Refills: 0 | Status: DISCONTINUED | OUTPATIENT
Start: 2021-03-05 | End: 2021-03-12

## 2021-03-05 RX ADMIN — Medication 20 MILLIEQUIVALENT(S): at 17:29

## 2021-03-05 RX ADMIN — Medication 1 TABLET(S): at 12:28

## 2021-03-05 RX ADMIN — Medication 50 GRAM(S): at 12:29

## 2021-03-05 RX ADMIN — POLYETHYLENE GLYCOL 3350 17 GRAM(S): 17 POWDER, FOR SOLUTION ORAL at 12:28

## 2021-03-05 RX ADMIN — ENOXAPARIN SODIUM 40 MILLIGRAM(S): 100 INJECTION SUBCUTANEOUS at 12:27

## 2021-03-05 RX ADMIN — Medication 105 MILLIGRAM(S): at 05:26

## 2021-03-05 RX ADMIN — Medication 105 MILLIGRAM(S): at 16:29

## 2021-03-05 RX ADMIN — Medication 20 MILLIGRAM(S): at 13:26

## 2021-03-05 RX ADMIN — PREGABALIN 1000 MICROGRAM(S): 225 CAPSULE ORAL at 12:28

## 2021-03-05 RX ADMIN — Medication 20 MILLIEQUIVALENT(S): at 12:28

## 2021-03-05 RX ADMIN — Medication 20 MILLIEQUIVALENT(S): at 14:40

## 2021-03-05 RX ADMIN — Medication 105 MILLIGRAM(S): at 23:16

## 2021-03-05 RX ADMIN — Medication 6 MILLIGRAM(S): at 23:16

## 2021-03-05 NOTE — OCCUPATIONAL THERAPY INITIAL EVALUATION ADULT - PERTINENT HX OF CURRENT PROBLEM, REHAB EVAL
65 year old female with ETOH use disorder, depression presenting with weakness likely due to vitamin deficiency vs infectious vs neuromuscular disease vs autoimmune.
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
## 2021-03-05 NOTE — OCCUPATIONAL THERAPY INITIAL EVALUATION ADULT - MANUAL MUSCLE TESTING RESULTS, REHAB EVAL
Bilateral UE grossly 4/5 throughout, bilateral LE grossly 4/5 throughout except bilateral hip flexors grossly 3-/5

## 2021-03-05 NOTE — PROGRESS NOTE ADULT - PROBLEM SELECTOR PLAN 1
Bilateral LE weakness started 2 months ago, prox > distal, equal bilaterally, no numbness or tingling and no imbalance.   - B12, CK and TSH normal, CRP (40) and ESR (77) elevated  - f/u folate  - start thiamine 500 mg TID x 3 days then 100 mg daily, multivitamin  - PT consult  - neuro consulted: apprec recs Bilateral LE weakness started 2 months ago, prox > distal, equal bilaterally, no numbness or tingling and no imbalance.   - B12 low/normal, CK and TSH normal, CRP (40) and ESR (77) elevated, high homocysteine (81.2), negative Silica clotting and Joel's Viper Venom  - s/p thiamine 500 mg TID x 3 days   - started 100 mg daily, multivitamin  - PT consult  - neuro consulted: apprec recs  - pending MRI head and C-spine  - pending Myasthenia gravis labs, autoimmune labs, and vitamin/mineral def labs, folate  - Started cyanocobalamin 1000 mcg daily
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
## 2021-03-05 NOTE — OCCUPATIONAL THERAPY INITIAL EVALUATION ADULT - GENERAL OBSERVATIONS, REHAB EVAL
Patient received semisupine in bed in [facility]. Per RN Tonia, patient okay to participate in OT evaluation.

## 2021-03-05 NOTE — PROGRESS NOTE ADULT - PROBLEM SELECTOR PLAN 7
BMI ?   - A1c 5.6  - f/u lipids  - compression stockings for LE edema BMI 33   - A1c 5.6  - Lipids normal: T chol 135, LDL 63  - compression stockings for LE edema

## 2021-03-05 NOTE — PROGRESS NOTE ADULT - PROBLEM SELECTOR PLAN 6
Feet have abnormal toenail growth  - podiatry consulted Feet have abnormal toenail growth  - podiatry consulted - completed treatment for toenails

## 2021-03-05 NOTE — OCCUPATIONAL THERAPY INITIAL EVALUATION ADULT - LIVES WITH, PROFILE
Patient lives in an apartment alone with 3 flight of stairs to access. Patient has a tub shower with no durable medical equipment.

## 2021-03-05 NOTE — PROGRESS NOTE ADULT - SUBJECTIVE AND OBJECTIVE BOX
***************************************************************  Nette Jaime, PGY1  Internal Medicine   pager: NS: [phone] [facility]: [phone]  ***************************************************************    PROGRESS NOTE:     Patient is a 65y old  Female who presents with a chief complaint of Bilateral LE weakness (proximal > distal) (04 Mar 2021 13:44)      SUBJECTIVE / OVERNIGHT EVENTS:      MEDICATIONS  (STANDING):  enoxaparin Injectable 40 milliGRAM(s) SubCutaneous daily  FLUoxetine 20 milliGRAM(s) Oral daily  influenza   Vaccine 0.5 milliLiter(s) IntraMuscular once  melatonin 6 milliGRAM(s) Oral at bedtime  multivitamin 1 Tablet(s) Oral daily  thiamine IVPB 500 milliGRAM(s) IV Intermittent three times a day    MEDICATIONS  (PRN):      CAPILLARY BLOOD GLUCOSE        I&O's Summary    03 Mar 2021 07:01  -  04 Mar 2021 07:00  --------------------------------------------------------  IN: 300 mL / OUT: 400 mL / NET: -100 mL        PHYSICAL EXAM:  Vital Signs Last 24 Hrs  T(C): 36.7 (05 Mar 2021 05:25), Max: 37.1 (04 Mar 2021 21:10)  T(F): 98.1 (05 Mar 2021 05:25), Max: 98.8 (04 Mar 2021 21:10)  HR: 82 (05 Mar 2021 05:25) (82 - 106)  BP: 132/78 (05 Mar 2021 05:25) (113/65 - 132/78)  BP(mean): --  RR: 16 (05 Mar 2021 05:25) (16 - 17)  SpO2: 96% (05 Mar 2021 05:25) (96% - 97%)    PHYSICAL EXAM:  GENERAL: NAD, well-groomed, well-developed  HEAD:  Atraumatic, Normocephalic  EYES: (+) xanthelasma. EOMI, PERRLA, conjunctiva and sclera clear.  ENMT: No tonsillar erythema, exudates, or enlargement; Moist mucous membranes, Good dentition, No lesions  NECK: Supple, No JVD, Normal thyroid  NERVOUS SYSTEM:  Alert & Oriented X3, Good concentration; Motor Strength 5/5 B/L upper. Lower extremities hip flexors 1/5, knee flexors 2/5, ankle flexors 5/5.  Sensation decreased R LE distally>proximally  CHEST/LUNG: Clear to percussion bilaterally; No rales, rhonchi, wheezing, or rubs  HEART: Regular rate and rhythm; No murmurs, rubs, or gallops  ABDOMEN: Soft, Nontender, Nondistended; Bowel sounds present  EXTREMITIES:  2+ Peripheral Pulses, No clubbing, cyanosis, or edema  LYMPH: No lymphadenopathy noted  SKIN: No rashes or lesions    LABS:                        10.8   7.01  )-----------( 316      ( 04 Mar 2021 07:12 )             33.5     03-04    141  |  106  |  8   ----------------------------<  77  3.4<L>   |  25  |  0.44<L>    Ca    8.3<L>      04 Mar 2021 07:12  Phos  3.0     03-04  Mg     1.5     03-04    TPro  6.5  /  Alb  3.0<L>  /  TBili  0.3  /  DBili  x   /  AST  21  /  ALT  11  /  AlkPhos  93  03-03      CARDIAC MARKERS ( 03 Mar 2021 14:15 )  x     / x     / 43 U/L / x     / x                RADIOLOGY & ADDITIONAL TESTS:  Results Reviewed:   Imaging Personally Reviewed:  Electrocardiogram Personally Reviewed:    COORDINATION OF CARE:  Care Discussed with Consultants/Other Providers [Y/N]:  Prior or Outpatient Records Reviewed [Y/N]:   ***************************************************************  Nette Jaime, PGY1  Internal Medicine   pager: NS: [phone] [facility]: [phone]  ***************************************************************    PROGRESS NOTE:     Patient is a 65y old  Female who presents with a chief complaint of Bilateral LE weakness (proximal > distal) (04 Mar 2021 13:44)      SUBJECTIVE / OVERNIGHT EVENTS:  Patient had no acute events overnight. She has more strength in her lower extremities today. Neuro had recommendations for MRI and TTE w/ bubble and labs for autoimmune, and vitamin/mineral levels, which are pending.    MEDICATIONS  (STANDING):  enoxaparin Injectable 40 milliGRAM(s) SubCutaneous daily  FLUoxetine 20 milliGRAM(s) Oral daily  influenza   Vaccine 0.5 milliLiter(s) IntraMuscular once  melatonin 6 milliGRAM(s) Oral at bedtime  multivitamin 1 Tablet(s) Oral daily  thiamine IVPB 500 milliGRAM(s) IV Intermittent three times a day    MEDICATIONS  (PRN):      CAPILLARY BLOOD GLUCOSE        I&O's Summary    03 Mar 2021 07:01  -  04 Mar 2021 07:00  --------------------------------------------------------  IN: 300 mL / OUT: 400 mL / NET: -100 mL        PHYSICAL EXAM:  Vital Signs Last 24 Hrs  T(C): 36.7 (05 Mar 2021 05:25), Max: 37.1 (04 Mar 2021 21:10)  T(F): 98.1 (05 Mar 2021 05:25), Max: 98.8 (04 Mar 2021 21:10)  HR: 82 (05 Mar 2021 05:25) (82 - 106)  BP: 132/78 (05 Mar 2021 05:25) (113/65 - 132/78)  BP(mean): --  RR: 16 (05 Mar 2021 05:25) (16 - 17)  SpO2: 96% (05 Mar 2021 05:25) (96% - 97%)    PHYSICAL EXAM:  GENERAL: NAD, well-groomed, well-developed  HEAD:  Atraumatic, Normocephalic  EYES: (+) xanthelasma. EOMI, PERRLA, conjunctiva and sclera clear.  ENMT: No tonsillar erythema, exudates, or enlargement; Moist mucous membranes, Good dentition, No lesions  NECK: Supple, No JVD, Normal thyroid  NERVOUS SYSTEM:  Alert & Oriented X3, Good concentration; Motor Strength 5/5 B/L upper. Lower extremities hip flexors 1/5, knee flexors 2/5, ankle flexors 5/5.  Sensation decreased R LE distally>proximally  CHEST/LUNG: Clear to percussion bilaterally; No rales, rhonchi, wheezing, or rubs  HEART: Regular rate and rhythm; No murmurs, rubs, or gallops  ABDOMEN: Soft, Nontender, Nondistended; Bowel sounds present  EXTREMITIES:  2+ Peripheral Pulses, No clubbing, cyanosis, or edema  LYMPH: No lymphadenopathy noted  SKIN: No rashes or lesions    LABS:                        10.8   7.01  )-----------( 316      ( 04 Mar 2021 07:12 )             33.5     03-04    141  |  106  |  8   ----------------------------<  77  3.4<L>   |  25  |  0.44<L>    Ca    8.3<L>      04 Mar 2021 07:12  Phos  3.0     03-04  Mg     1.5     03-04    TPro  6.5  /  Alb  3.0<L>  /  TBili  0.3  /  DBili  x   /  AST  21  /  ALT  11  /  AlkPhos  93  03-03      CARDIAC MARKERS ( 03 Mar 2021 14:15 )  x     / x     / 43 U/L / x     / x                RADIOLOGY & ADDITIONAL TESTS:  Results Reviewed:   Imaging Personally Reviewed:  Electrocardiogram Personally Reviewed:    COORDINATION OF CARE:  Care Discussed with Consultants/Other Providers [Y/N]:  Prior or Outpatient Records Reviewed [Y/N]:   ***************************************************************  Nette Jaime, PGY1  Internal Medicine   pager: NS: [phone] [facility]: [phone]  ***************************************************************    PROGRESS NOTE:     Patient is a 65y old  Female who presents with a chief complaint of Bilateral LE weakness (proximal > distal) (04 Mar 2021 13:44)      SUBJECTIVE / OVERNIGHT EVENTS:  Patient had no acute events overnight. She has more strength in her lower extremities today. Neuro had recommendations for MRI and labs for autoimmune, and vitamin/mineral levels, which are pending.    MEDICATIONS  (STANDING):  enoxaparin Injectable 40 milliGRAM(s) SubCutaneous daily  FLUoxetine 20 milliGRAM(s) Oral daily  influenza   Vaccine 0.5 milliLiter(s) IntraMuscular once  melatonin 6 milliGRAM(s) Oral at bedtime  multivitamin 1 Tablet(s) Oral daily  thiamine IVPB 500 milliGRAM(s) IV Intermittent three times a day    MEDICATIONS  (PRN):      CAPILLARY BLOOD GLUCOSE        I&O's Summary    03 Mar 2021 07:01  -  04 Mar 2021 07:00  --------------------------------------------------------  IN: 300 mL / OUT: 400 mL / NET: -100 mL        PHYSICAL EXAM:  Vital Signs Last 24 Hrs  T(C): 36.7 (05 Mar 2021 05:25), Max: 37.1 (04 Mar 2021 21:10)  T(F): 98.1 (05 Mar 2021 05:25), Max: 98.8 (04 Mar 2021 21:10)  HR: 82 (05 Mar 2021 05:25) (82 - 106)  BP: 132/78 (05 Mar 2021 05:25) (113/65 - 132/78)  BP(mean): --  RR: 16 (05 Mar 2021 05:25) (16 - 17)  SpO2: 96% (05 Mar 2021 05:25) (96% - 97%)    PHYSICAL EXAM:  GENERAL: NAD, well-groomed, well-developed  HEAD:  Atraumatic, Normocephalic  EYES: (+) xanthelasma. EOMI, PERRLA, conjunctiva and sclera clear.  ENMT: No tonsillar erythema, exudates, or enlargement; Moist mucous membranes, Good dentition, No lesions  NECK: Supple, No JVD, Normal thyroid  NERVOUS SYSTEM:  Alert & Oriented X3, Good concentration; Motor Strength 5/5 B/L upper. Lower extremities hip flexors 3/5, knee flexors 4/5, ankle flexors 5/5.  Sensation decreased R LE distally>proximally  CHEST/LUNG: Clear to percussion bilaterally; No rales, rhonchi, wheezing, or rubs  HEART: Regular rate and rhythm; No murmurs, rubs, or gallops  ABDOMEN: Soft, Nontender, Nondistended; Bowel sounds present  EXTREMITIES:  2+ Peripheral Pulses, No clubbing, cyanosis, or edema  LYMPH: No lymphadenopathy noted  SKIN: No rashes or lesions    LABS:                        10.8   7.01  )-----------( 316      ( 04 Mar 2021 07:12 )             33.5     03-04    141  |  106  |  8   ----------------------------<  77  3.4<L>   |  25  |  0.44<L>    Ca    8.3<L>      04 Mar 2021 07:12  Phos  3.0     03-04  Mg     1.5     03-04    TPro  6.5  /  Alb  3.0<L>  /  TBili  0.3  /  DBili  x   /  AST  21  /  ALT  11  /  AlkPhos  93  03-03      CARDIAC MARKERS ( 03 Mar 2021 14:15 )  x     / x     / 43 U/L / x     / x                RADIOLOGY & ADDITIONAL TESTS:  Results Reviewed:   Imaging Personally Reviewed:  Electrocardiogram Personally Reviewed:    COORDINATION OF CARE:  Care Discussed with Consultants/Other Providers [Y/N]:  Prior or Outpatient Records Reviewed [Y/N]:   ***************************************************************  Nette Jaime, PGY1  Internal Medicine   pager: NS: [phone] [facility]: [phone]  ***************************************************************    PROGRESS NOTE:     Patient is a 65y old  Female who presents with a chief complaint of Bilateral LE weakness (proximal > distal) (04 Mar 2021 13:44)      SUBJECTIVE / OVERNIGHT EVENTS:  Patient had no acute events overnight. She has more strength in her lower extremities today. Neuro had recommendations for MRI and labs for autoimmune, and vitamin/mineral levels, which are pending.    MEDICATIONS  (STANDING):  enoxaparin Injectable 40 milliGRAM(s) SubCutaneous daily  FLUoxetine 20 milliGRAM(s) Oral daily  influenza   Vaccine 0.5 milliLiter(s) IntraMuscular once  melatonin 6 milliGRAM(s) Oral at bedtime  multivitamin 1 Tablet(s) Oral daily  thiamine IVPB 500 milliGRAM(s) IV Intermittent three times a day    MEDICATIONS  (PRN):      CAPILLARY BLOOD GLUCOSE        I&O's Summary    03 Mar 2021 07:01  -  04 Mar 2021 07:00  --------------------------------------------------------  IN: 300 mL / OUT: 400 mL / NET: -100 mL        PHYSICAL EXAM:  Vital Signs Last 24 Hrs  T(C): 36.7 (05 Mar 2021 05:25), Max: 37.1 (04 Mar 2021 21:10)  T(F): 98.1 (05 Mar 2021 05:25), Max: 98.8 (04 Mar 2021 21:10)  HR: 82 (05 Mar 2021 05:25) (82 - 106)  BP: 132/78 (05 Mar 2021 05:25) (113/65 - 132/78)  BP(mean): --  RR: 16 (05 Mar 2021 05:25) (16 - 17)  SpO2: 96% (05 Mar 2021 05:25) (96% - 97%)    PHYSICAL EXAM:  GENERAL: NAD, well-groomed, well-developed  HEAD:  Atraumatic, Normocephalic  EYES: (+) xanthelasma. EOMI, PERRLA, conjunctiva and sclera clear.  ENMT: No tonsillar erythema, exudates, or enlargement; Moist mucous membranes, Good dentition, No lesions  NECK: Supple, No JVD, Normal thyroid  NERVOUS SYSTEM:  Alert & Oriented X3, Good concentration; Motor Strength 5/5 B/L upper. Lower extremities hip flexors 3/5, knee flexors 4/5, ankle flexors 5/5.  Sensation decreased R LE distally>proximally  CHEST/LUNG: Clear to percussion bilaterally; No rales, rhonchi, wheezing, or rubs  HEART: Regular rate and rhythm; No murmurs, rubs, or gallops  ABDOMEN: Soft, Nontender, Nondistended; Bowel sounds present  EXTREMITIES:  2+ Peripheral Pulses, No clubbing, cyanosis, or edema  LYMPH: No lymphadenopathy noted  SKIN: No rashes or lesions    LABS:                        10.8   7.01  )-----------( 316      ( 04 Mar 2021 07:12 )             33.5     03-04    141  |  106  |  8   ----------------------------<  77  3.4<L>   |  25  |  0.44<L>    Ca    8.3<L>      04 Mar 2021 07:12  Phos  3.0     03-04  Mg     1.5     03-04    TPro  6.5  /  Alb  3.0<L>  /  TBili  0.3  /  DBili  x   /  AST  21  /  ALT  11  /  AlkPhos  93  03-03      CARDIAC MARKERS ( 03 Mar 2021 14:15 )  x     / x     / 43 U/L / x     / x                RADIOLOGY & ADDITIONAL TESTS:  Results Reviewed:   Imaging Personally Reviewed:  Electrocardiogram Personally Reviewed:    COORDINATION OF CARE:  Care Discussed with Consultants/Other Providers [Y/N]: MAKSIM Rowe, will replete B12, check imaging  Prior or Outpatient Records Reviewed [Y/N]:

## 2021-03-05 NOTE — OCCUPATIONAL THERAPY INITIAL EVALUATION ADULT - ADDITIONAL COMMENTS
Patient admitted from substance abuse rehab. At rehab, patient became progressively weaker and needed to use a walker/wheelchair for mobility. Prior to rehab stay, patient was independent with ADLs and functional mobility.

## 2021-03-06 LAB
ALBUMIN SERPL ELPH-MCNC: 2.3 G/DL — LOW (ref 3.3–5)
ALP SERPL-CCNC: 69 U/L — SIGNIFICANT CHANGE UP (ref 40–120)
ALT FLD-CCNC: 6 U/L — SIGNIFICANT CHANGE UP (ref 4–33)
ANION GAP SERPL CALC-SCNC: 10 MMOL/L — SIGNIFICANT CHANGE UP (ref 7–14)
AST SERPL-CCNC: 18 U/L — SIGNIFICANT CHANGE UP (ref 4–32)
BASOPHILS # BLD AUTO: 0.04 K/UL — SIGNIFICANT CHANGE UP (ref 0–0.2)
BASOPHILS NFR BLD AUTO: 0.5 % — SIGNIFICANT CHANGE UP (ref 0–2)
BILIRUB SERPL-MCNC: 0.2 MG/DL — SIGNIFICANT CHANGE UP (ref 0.2–1.2)
BUN SERPL-MCNC: 6 MG/DL — LOW (ref 7–23)
CALCIUM SERPL-MCNC: 8.4 MG/DL — SIGNIFICANT CHANGE UP (ref 8.4–10.5)
CCP IGG SERPL-ACNC: <8 UNITS — SIGNIFICANT CHANGE UP
CHLORIDE SERPL-SCNC: 109 MMOL/L — HIGH (ref 98–107)
CO2 SERPL-SCNC: 23 MMOL/L — SIGNIFICANT CHANGE UP (ref 22–31)
CREAT SERPL-MCNC: 0.47 MG/DL — LOW (ref 0.5–1.3)
DSDNA AB FLD-ACNC: <0.2 AI — SIGNIFICANT CHANGE UP
ENA SS-A AB FLD IA-ACNC: <0.2 AI — SIGNIFICANT CHANGE UP
EOSINOPHIL # BLD AUTO: 0.33 K/UL — SIGNIFICANT CHANGE UP (ref 0–0.5)
EOSINOPHIL NFR BLD AUTO: 4.5 % — SIGNIFICANT CHANGE UP (ref 0–6)
FOLATE SERPL-MCNC: 3 NG/ML — LOW (ref 3.1–17.5)
GLUCOSE SERPL-MCNC: 76 MG/DL — SIGNIFICANT CHANGE UP (ref 70–99)
HCT VFR BLD CALC: 34.1 % — LOW (ref 34.5–45)
HGB BLD-MCNC: 10.9 G/DL — LOW (ref 11.5–15.5)
IANC: 4.3 K/UL — SIGNIFICANT CHANGE UP (ref 1.5–8.5)
IMM GRANULOCYTES NFR BLD AUTO: 0.3 % — SIGNIFICANT CHANGE UP (ref 0–1.5)
LYMPHOCYTES # BLD AUTO: 2.12 K/UL — SIGNIFICANT CHANGE UP (ref 1–3.3)
LYMPHOCYTES # BLD AUTO: 28.8 % — SIGNIFICANT CHANGE UP (ref 13–44)
MAGNESIUM SERPL-MCNC: 2 MG/DL — SIGNIFICANT CHANGE UP (ref 1.6–2.6)
MCHC RBC-ENTMCNC: 32 GM/DL — SIGNIFICANT CHANGE UP (ref 32–36)
MCHC RBC-ENTMCNC: 34.8 PG — HIGH (ref 27–34)
MCV RBC AUTO: 108.9 FL — HIGH (ref 80–100)
MONOCYTES # BLD AUTO: 0.56 K/UL — SIGNIFICANT CHANGE UP (ref 0–0.9)
MONOCYTES NFR BLD AUTO: 7.6 % — SIGNIFICANT CHANGE UP (ref 2–14)
NEUTROPHILS # BLD AUTO: 4.3 K/UL — SIGNIFICANT CHANGE UP (ref 1.8–7.4)
NEUTROPHILS NFR BLD AUTO: 58.3 % — SIGNIFICANT CHANGE UP (ref 43–77)
NRBC # BLD: 0 /100 WBCS — SIGNIFICANT CHANGE UP
NRBC # FLD: 0 K/UL — SIGNIFICANT CHANGE UP
PHOSPHATE SERPL-MCNC: 2.8 MG/DL — SIGNIFICANT CHANGE UP (ref 2.5–4.5)
PLATELET # BLD AUTO: 346 K/UL — SIGNIFICANT CHANGE UP (ref 150–400)
POTASSIUM SERPL-MCNC: 4.4 MMOL/L — SIGNIFICANT CHANGE UP (ref 3.5–5.3)
POTASSIUM SERPL-SCNC: 4.4 MMOL/L — SIGNIFICANT CHANGE UP (ref 3.5–5.3)
PROT SERPL-MCNC: 5.2 G/DL — LOW (ref 6–8.3)
RBC # BLD: 3.13 M/UL — LOW (ref 3.8–5.2)
RBC # FLD: 16.8 % — HIGH (ref 10.3–14.5)
RF+CCP IGG SER-IMP: NEGATIVE — SIGNIFICANT CHANGE UP
SODIUM SERPL-SCNC: 142 MMOL/L — SIGNIFICANT CHANGE UP (ref 135–145)
WBC # BLD: 7.37 K/UL — SIGNIFICANT CHANGE UP (ref 3.8–10.5)
WBC # FLD AUTO: 7.37 K/UL — SIGNIFICANT CHANGE UP (ref 3.8–10.5)

## 2021-03-06 PROCEDURE — 99232 SBSQ HOSP IP/OBS MODERATE 35: CPT | Mod: GC

## 2021-03-06 RX ORDER — FOLIC ACID 0.8 MG
1 TABLET ORAL DAILY
Refills: 0 | Status: DISCONTINUED | OUTPATIENT
Start: 2021-03-06 | End: 2021-03-08

## 2021-03-06 RX ORDER — ONDANSETRON 8 MG/1
4 TABLET, FILM COATED ORAL ONCE
Refills: 0 | Status: DISCONTINUED | OUTPATIENT
Start: 2021-03-06 | End: 2021-03-06

## 2021-03-06 RX ADMIN — Medication 105 MILLIGRAM(S): at 07:06

## 2021-03-06 RX ADMIN — Medication 6 MILLIGRAM(S): at 21:18

## 2021-03-06 RX ADMIN — Medication 100 MILLIGRAM(S): at 11:49

## 2021-03-06 RX ADMIN — ENOXAPARIN SODIUM 40 MILLIGRAM(S): 100 INJECTION SUBCUTANEOUS at 11:49

## 2021-03-06 RX ADMIN — Medication 1 TABLET(S): at 11:50

## 2021-03-06 RX ADMIN — PREGABALIN 1000 MICROGRAM(S): 225 CAPSULE ORAL at 11:50

## 2021-03-06 RX ADMIN — Medication 105 MILLIGRAM(S): at 13:26

## 2021-03-06 RX ADMIN — Medication 20 MILLIGRAM(S): at 11:50

## 2021-03-06 NOTE — PROGRESS NOTE ADULT - PROBLEM SELECTOR PLAN 1
Bilateral LE weakness started 2 months ago, prox > distal, equal bilaterally, no numbness or tingling and no imbalance.   - B12 low/normal, CK and TSH normal, CRP (40) and ESR (77) elevated, high homocysteine (81.2), negative Silica clotting and Joel's Viper Venom  - s/p thiamine 500 mg TID x 3 days   - started 100 mg daily, multivitamin  - PT consult  - neuro consulted: apprec recs  - pending MRI head and C-spine  - pending Myasthenia gravis labs, autoimmune labs, and vitamin/mineral def labs, folate  - Started cyanocobalamin 1000 mcg daily

## 2021-03-06 NOTE — PROGRESS NOTE ADULT - SUBJECTIVE AND OBJECTIVE BOX
PROGRESS NOTE:   Authored by Dr. Len Van MD Pager [phone] [facility], [facility] [address]    Patient is a 65y old  Female who presents with a chief complaint of Bilateral LE weakness (proximal > distal) (05 Mar 2021 05:54)      SUBJECTIVE / OVERNIGHT EVENTS:    ADDITIONAL REVIEW OF SYSTEMS:    MEDICATIONS  (STANDING):  cyanocobalamin 1000 MICROGram(s) Oral daily  enoxaparin Injectable 40 milliGRAM(s) SubCutaneous daily  FLUoxetine 20 milliGRAM(s) Oral daily  influenza   Vaccine 0.5 milliLiter(s) IntraMuscular once  melatonin 6 milliGRAM(s) Oral at bedtime  multivitamin 1 Tablet(s) Oral daily  polyethylene glycol 3350 17 Gram(s) Oral daily  senna 2 Tablet(s) Oral at bedtime  thiamine 100 milliGRAM(s) Oral daily  thiamine IVPB 500 milliGRAM(s) IV Intermittent three times a day    MEDICATIONS  (PRN):      CAPILLARY BLOOD GLUCOSE        I&O's Summary      PHYSICAL EXAM:  Vital Signs Last 24 Hrs  T(C): 36.7 (05 Mar 2021 21:13), Max: 36.9 (05 Mar 2021 08:45)  T(F): 98 (05 Mar 2021 21:13), Max: 98.5 (05 Mar 2021 08:45)  HR: 94 (05 Mar 2021 21:13) (94 - 96)  BP: 132/75 (05 Mar 2021 21:13) (115/75 - 132/75)  BP(mean): --  RR: 19 (05 Mar 2021 21:13) (16 - 19)  SpO2: 100% (05 Mar 2021 21:13) (97% - 100%)  GENERAL: NAD, lying comfortably in bed  HEAD: Atraumatic, normocephalic  EYES: EOMI b/l PERRLA b/l, conjunctiva and sclera clear  NECK: Supple, No JVD, No LAD  RESPIRATORY: Normal respiratory effort; lungs are clear to auscultation bilaterally  CARDIOVASCULAR: Regular rate and rhythm, normal S1 and S2, no murmur/rub/gallop; No lower extremity edema; Peripheral pulses are 2+ bilaterally  ABDOMEN: Nontender to palpation, normoactive bowel sounds, no rebound/guarding; No hepatosplenomegaly  MUSCLOSKELETAL: no clubbing or cyanosis of digits; no joint swelling or tenderness to palpation  NEURO: Non focal   PSYCH: A+O to person, place, and time; affect appropriate    LABS:                        10.5   7.03  )-----------( 327      ( 05 Mar 2021 07:46 )             32.8     03-05    138  |  106  |  8   ----------------------------<  76  3.1<L>   |  25  |  0.48<L>    Ca    8.3<L>      05 Mar 2021 07:50  Phos  3.3     03-05  Mg     1.6     03-05    TPro  5.0<L>  /  Alb  x   /  TBili  x   /  DBili  x   /  AST  x   /  ALT  x   /  AlkPhos  x   03-05    PT/INR - ( 05 Mar 2021 07:46 )   PT: 12.4 sec;   INR: 1.08 ratio         PTT - ( 05 Mar 2021 07:46 )  PTT:30.4 sec  CARDIAC MARKERS ( 05 Mar 2021 07:50 )  x     / x     / 31 U/L / x     / x                Tele Reviewed:    RADIOLOGY & ADDITIONAL TESTS:  Results Reviewed:   Imaging Personally Reviewed:  Electrocardiogram Personally Reviewed:    COORDINATION OF CARE:  Care Discussed with Consultants/Other Providers [Y/N]:  Prior or Outpatient Records Reviewed [Y/N]:   PROGRESS NOTE:   Authored by Dr. Len Van MD Pager [phone] [facility], [facility] [address]    Patient is a 65y old  Female who presents with a chief complaint of Bilateral LE weakness (proximal > distal) (05 Mar 2021 05:54)      SUBJECTIVE / OVERNIGHT EVENTS: no overnight events, continues to feel stronger especially with the lower extremities, no new complaints. Patient denies fevers, chills, chest pain, shortness of breath, nausea, abdominal pain, diarrhea, constipation, dysuria, leg swelling, headache, light headedness    ADDITIONAL REVIEW OF SYSTEMS:    MEDICATIONS  (STANDING):  cyanocobalamin 1000 MICROGram(s) Oral daily  enoxaparin Injectable 40 milliGRAM(s) SubCutaneous daily  FLUoxetine 20 milliGRAM(s) Oral daily  influenza   Vaccine 0.5 milliLiter(s) IntraMuscular once  melatonin 6 milliGRAM(s) Oral at bedtime  multivitamin 1 Tablet(s) Oral daily  polyethylene glycol 3350 17 Gram(s) Oral daily  senna 2 Tablet(s) Oral at bedtime  thiamine 100 milliGRAM(s) Oral daily  thiamine IVPB 500 milliGRAM(s) IV Intermittent three times a day    MEDICATIONS  (PRN):      CAPILLARY BLOOD GLUCOSE        I&O's Summary      PHYSICAL EXAM:  Vital Signs Last 24 Hrs  T(C): 36.7 (05 Mar 2021 21:13), Max: 36.9 (05 Mar 2021 08:45)  T(F): 98 (05 Mar 2021 21:13), Max: 98.5 (05 Mar 2021 08:45)  HR: 94 (05 Mar 2021 21:13) (94 - 96)  BP: 132/75 (05 Mar 2021 21:13) (115/75 - 132/75)  BP(mean): --  RR: 19 (05 Mar 2021 21:13) (16 - 19)  SpO2: 100% (05 Mar 2021 21:13) (97% - 100%)  GENERAL: NAD, lying comfortably in bed  HEAD: Atraumatic, normocephalic  EYES: EOMI b/l PERRLA b/l, conjunctiva and sclera clear  NECK: Supple, No JVD, No LAD  RESPIRATORY: Normal respiratory effort; lungs are clear to auscultation bilaterally  CARDIOVASCULAR: Regular rate and rhythm, normal S1 and S2, no murmur/rub/gallop; No lower extremity edema; Peripheral pulses are 2+ bilaterally  ABDOMEN: Nontender to palpation, normoactive bowel sounds, no rebound/guarding; No hepatosplenomegaly  MUSCLOSKELETAL: no clubbing or cyanosis of digits; no joint swelling or tenderness to palpation  NEURO: Non focal, strength equal bilaterally 4/5  PSYCH: A+O to person, place, and time; affect appropriate    LABS:                        10.5   7.03  )-----------( 327      ( 05 Mar 2021 07:46 )             32.8     03-05    138  |  106  |  8   ----------------------------<  76  3.1<L>   |  25  |  0.48<L>    Ca    8.3<L>      05 Mar 2021 07:50  Phos  3.3     03-05  Mg     1.6     03-05    TPro  5.0<L>  /  Alb  x   /  TBili  x   /  DBili  x   /  AST  x   /  ALT  x   /  AlkPhos  x   03-05    PT/INR - ( 05 Mar 2021 07:46 )   PT: 12.4 sec;   INR: 1.08 ratio         PTT - ( 05 Mar 2021 07:46 )  PTT:30.4 sec  CARDIAC MARKERS ( 05 Mar 2021 07:50 )  x     / x     / 31 U/L / x     / x                Tele Reviewed:    RADIOLOGY & ADDITIONAL TESTS:  Results Reviewed:   Imaging Personally Reviewed:  Electrocardiogram Personally Reviewed:    COORDINATION OF CARE:  Care Discussed with Consultants/Other Providers [Y/N]:  Prior or Outpatient Records Reviewed [Y/N]:
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
## 2021-03-07 LAB
ANION GAP SERPL CALC-SCNC: 9 MMOL/L — SIGNIFICANT CHANGE UP (ref 7–14)
BASOPHILS # BLD AUTO: 0.03 K/UL — SIGNIFICANT CHANGE UP (ref 0–0.2)
BASOPHILS NFR BLD AUTO: 0.4 % — SIGNIFICANT CHANGE UP (ref 0–2)
BUN SERPL-MCNC: 6 MG/DL — LOW (ref 7–23)
CALCIUM SERPL-MCNC: 8.6 MG/DL — SIGNIFICANT CHANGE UP (ref 8.4–10.5)
CHLORIDE SERPL-SCNC: 109 MMOL/L — HIGH (ref 98–107)
CO2 SERPL-SCNC: 23 MMOL/L — SIGNIFICANT CHANGE UP (ref 22–31)
CREAT SERPL-MCNC: 0.49 MG/DL — LOW (ref 0.5–1.3)
EOSINOPHIL # BLD AUTO: 0.32 K/UL — SIGNIFICANT CHANGE UP (ref 0–0.5)
EOSINOPHIL NFR BLD AUTO: 4.6 % — SIGNIFICANT CHANGE UP (ref 0–6)
GLUCOSE SERPL-MCNC: 81 MG/DL — SIGNIFICANT CHANGE UP (ref 70–99)
HCT VFR BLD CALC: 32.5 % — LOW (ref 34.5–45)
HGB BLD-MCNC: 10.7 G/DL — LOW (ref 11.5–15.5)
IANC: 4.36 K/UL — SIGNIFICANT CHANGE UP (ref 1.5–8.5)
IMM GRANULOCYTES NFR BLD AUTO: 0.3 % — SIGNIFICANT CHANGE UP (ref 0–1.5)
LYMPHOCYTES # BLD AUTO: 1.69 K/UL — SIGNIFICANT CHANGE UP (ref 1–3.3)
LYMPHOCYTES # BLD AUTO: 24.3 % — SIGNIFICANT CHANGE UP (ref 13–44)
MAGNESIUM SERPL-MCNC: 1.9 MG/DL — SIGNIFICANT CHANGE UP (ref 1.6–2.6)
MCHC RBC-ENTMCNC: 32.9 GM/DL — SIGNIFICANT CHANGE UP (ref 32–36)
MCHC RBC-ENTMCNC: 35.1 PG — HIGH (ref 27–34)
MCV RBC AUTO: 106.6 FL — HIGH (ref 80–100)
MONOCYTES # BLD AUTO: 0.53 K/UL — SIGNIFICANT CHANGE UP (ref 0–0.9)
MONOCYTES NFR BLD AUTO: 7.6 % — SIGNIFICANT CHANGE UP (ref 2–14)
NEUTROPHILS # BLD AUTO: 4.36 K/UL — SIGNIFICANT CHANGE UP (ref 1.8–7.4)
NEUTROPHILS NFR BLD AUTO: 62.8 % — SIGNIFICANT CHANGE UP (ref 43–77)
NRBC # BLD: 0 /100 WBCS — SIGNIFICANT CHANGE UP
NRBC # FLD: 0 K/UL — SIGNIFICANT CHANGE UP
PHOSPHATE SERPL-MCNC: 3.4 MG/DL — SIGNIFICANT CHANGE UP (ref 2.5–4.5)
PLATELET # BLD AUTO: 344 K/UL — SIGNIFICANT CHANGE UP (ref 150–400)
POTASSIUM SERPL-MCNC: 4.3 MMOL/L — SIGNIFICANT CHANGE UP (ref 3.5–5.3)
POTASSIUM SERPL-SCNC: 4.3 MMOL/L — SIGNIFICANT CHANGE UP (ref 3.5–5.3)
RBC # BLD: 3.05 M/UL — LOW (ref 3.8–5.2)
RBC # FLD: 16.7 % — HIGH (ref 10.3–14.5)
SODIUM SERPL-SCNC: 141 MMOL/L — SIGNIFICANT CHANGE UP (ref 135–145)
WBC # BLD: 6.95 K/UL — SIGNIFICANT CHANGE UP (ref 3.8–10.5)
WBC # FLD AUTO: 6.95 K/UL — SIGNIFICANT CHANGE UP (ref 3.8–10.5)

## 2021-03-07 PROCEDURE — 99232 SBSQ HOSP IP/OBS MODERATE 35: CPT | Mod: GC

## 2021-03-07 RX ORDER — FOLIC ACID 0.8 MG
1 TABLET ORAL DAILY
Refills: 0 | Status: DISCONTINUED | OUTPATIENT
Start: 2021-03-07 | End: 2021-03-12

## 2021-03-07 RX ADMIN — PREGABALIN 1000 MICROGRAM(S): 225 CAPSULE ORAL at 12:00

## 2021-03-07 RX ADMIN — SENNA PLUS 2 TABLET(S): 8.6 TABLET ORAL at 21:07

## 2021-03-07 RX ADMIN — ENOXAPARIN SODIUM 40 MILLIGRAM(S): 100 INJECTION SUBCUTANEOUS at 12:01

## 2021-03-07 RX ADMIN — Medication 1 TABLET(S): at 12:00

## 2021-03-07 RX ADMIN — Medication 100 MILLIGRAM(S): at 12:00

## 2021-03-07 RX ADMIN — Medication 1 MILLIGRAM(S): at 12:00

## 2021-03-07 RX ADMIN — Medication 20 MILLIGRAM(S): at 12:00

## 2021-03-07 RX ADMIN — Medication 6 MILLIGRAM(S): at 21:08

## 2021-03-07 RX ADMIN — POLYETHYLENE GLYCOL 3350 17 GRAM(S): 17 POWDER, FOR SOLUTION ORAL at 12:00

## 2021-03-07 NOTE — PROGRESS NOTE ADULT - SUBJECTIVE AND OBJECTIVE BOX
***************************************************************  Nette Jaime, PGY1  Internal Medicine   pager: NS: [phone] [facility]: [id number]  ***************************************************************    PROGRESS NOTE:     Patient is a 65y old  Female who presents with a chief complaint of Bilateral LE weakness (proximal > distal) (06 Mar 2021 06:59)      SUBJECTIVE / OVERNIGHT EVENTS:      MEDICATIONS  (STANDING):  cyanocobalamin 1000 MICROGram(s) Oral daily  enoxaparin Injectable 40 milliGRAM(s) SubCutaneous daily  FLUoxetine 20 milliGRAM(s) Oral daily  folic acid 1 milliGRAM(s) Oral daily  influenza   Vaccine 0.5 milliLiter(s) IntraMuscular once  melatonin 6 milliGRAM(s) Oral at bedtime  multivitamin 1 Tablet(s) Oral daily  polyethylene glycol 3350 17 Gram(s) Oral daily  senna 2 Tablet(s) Oral at bedtime  thiamine 100 milliGRAM(s) Oral daily    MEDICATIONS  (PRN):      CAPILLARY BLOOD GLUCOSE        I&O's Summary    06 Mar 2021 07:01  -  07 Mar 2021 05:51  --------------------------------------------------------  IN: 500 mL / OUT: 0 mL / NET: 500 mL        PHYSICAL EXAM:  Vital Signs Last 24 Hrs  T(C): 36.9 (07 Mar 2021 04:11), Max: 36.9 (06 Mar 2021 15:40)  T(F): 98.5 (07 Mar 2021 04:11), Max: 98.5 (07 Mar 2021 04:11)  HR: 98 (07 Mar 2021 04:11) (91 - 98)  BP: 135/80 (07 Mar 2021 04:11) (126/71 - 135/80)  BP(mean): --  RR: 18 (07 Mar 2021 04:11) (17 - 18)  SpO2: 95% (07 Mar 2021 04:11) (95% - 100%)    PHYSICAL EXAM:  GENERAL: NAD, well-groomed, well-developed  HEAD:  Atraumatic, Normocephalic  EYES: (+) xanthelasma. EOMI, PERRLA, conjunctiva and sclera clear.  ENMT: No tonsillar erythema, exudates, or enlargement; Moist mucous membranes, Good dentition, No lesions  NECK: Supple, No JVD, Normal thyroid  NERVOUS SYSTEM:  Alert & Oriented X3, Good concentration; Motor Strength 5/5 B/L upper. Lower extremities hip flexors 3/5, knee flexors 4/5, ankle flexors 5/5.  Sensation decreased R LE distally>proximally  CHEST/LUNG: Clear to percussion bilaterally; No rales, rhonchi, wheezing, or rubs  HEART: Regular rate and rhythm; No murmurs, rubs, or gallops  ABDOMEN: Soft, Nontender, Nondistended; Bowel sounds present  EXTREMITIES:  2+ Peripheral Pulses, No clubbing, cyanosis, or edema  LYMPH: No lymphadenopathy noted  SKIN: No rashes or lesions    LABS:                        10.9   7.37  )-----------( 346      ( 06 Mar 2021 08:13 )             34.1     03-06    142  |  109<H>  |  6<L>  ----------------------------<  76  4.4   |  23  |  0.47<L>    Ca    8.4      06 Mar 2021 08:13  Phos  2.8     03-06  Mg     2.0     03-06    TPro  5.2<L>  /  Alb  2.3<L>  /  TBili  0.2  /  DBili  x   /  AST  18  /  ALT  6   /  AlkPhos  69  03-06    PT/INR - ( 05 Mar 2021 07:46 )   PT: 12.4 sec;   INR: 1.08 ratio         PTT - ( 05 Mar 2021 07:46 )  PTT:30.4 sec  CARDIAC MARKERS ( 05 Mar 2021 07:50 )  x     / x     / 31 U/L / x     / x                RADIOLOGY & ADDITIONAL TESTS:  Results Reviewed:   Imaging Personally Reviewed:  Electrocardiogram Personally Reviewed:    COORDINATION OF CARE:  Care Discussed with Consultants/Other Providers [Y/N]:  Prior or Outpatient Records Reviewed [Y/N]:   ***************************************************************  Nette Jaime, PGY1  Internal Medicine   pager: NS: [phone] [facility]: [phone]  ***************************************************************    PROGRESS NOTE:     Patient is a 65y old  Female who presents with a chief complaint of Bilateral LE weakness (proximal > distal) (06 Mar 2021 06:59)      SUBJECTIVE / OVERNIGHT EVENTS:  Patient had a number of PT sessions and required using a walker due to leg weakness. Today, her strength is improved in the hip flexors 4/5 and arm flexors 4/5. She is having normal BMs. She was unable to have an MRI due to claustrophobia and request for sedating medication.     MEDICATIONS  (STANDING):  cyanocobalamin 1000 MICROGram(s) Oral daily  enoxaparin Injectable 40 milliGRAM(s) SubCutaneous daily  FLUoxetine 20 milliGRAM(s) Oral daily  folic acid 1 milliGRAM(s) Oral daily  influenza   Vaccine 0.5 milliLiter(s) IntraMuscular once  melatonin 6 milliGRAM(s) Oral at bedtime  multivitamin 1 Tablet(s) Oral daily  polyethylene glycol 3350 17 Gram(s) Oral daily  senna 2 Tablet(s) Oral at bedtime  thiamine 100 milliGRAM(s) Oral daily    MEDICATIONS  (PRN):      CAPILLARY BLOOD GLUCOSE        I&O's Summary    06 Mar 2021 07:01  -  07 Mar 2021 05:51  --------------------------------------------------------  IN: 500 mL / OUT: 0 mL / NET: 500 mL        PHYSICAL EXAM:  Vital Signs Last 24 Hrs  T(C): 36.9 (07 Mar 2021 04:11), Max: 36.9 (06 Mar 2021 15:40)  T(F): 98.5 (07 Mar 2021 04:11), Max: 98.5 (07 Mar 2021 04:11)  HR: 98 (07 Mar 2021 04:11) (91 - 98)  BP: 135/80 (07 Mar 2021 04:11) (126/71 - 135/80)  BP(mean): --  RR: 18 (07 Mar 2021 04:11) (17 - 18)  SpO2: 95% (07 Mar 2021 04:11) (95% - 100%)    PHYSICAL EXAM:  GENERAL: NAD, well-groomed, well-developed  HEAD:  Atraumatic, Normocephalic  EYES: (+) xanthelasma. EOMI, PERRLA, conjunctiva and sclera clear.  ENMT: No tonsillar erythema, exudates, or enlargement; Moist mucous membranes, Good dentition, No lesions  NECK: Supple, No JVD, Normal thyroid  NERVOUS SYSTEM:  Alert & Oriented X3, Good concentration; Motor Strength 5/5 B/L upper. Lower extremities hip flexors 3/5, knee flexors 4/5, ankle flexors 5/5.  Sensation decreased R LE distally>proximally  CHEST/LUNG: Clear to percussion bilaterally; No rales, rhonchi, wheezing, or rubs  HEART: Regular rate and rhythm; No murmurs, rubs, or gallops  ABDOMEN: Soft, Nontender, Nondistended; Bowel sounds present  EXTREMITIES:  2+ Peripheral Pulses, No clubbing, cyanosis, or edema  LYMPH: No lymphadenopathy noted  SKIN: No rashes or lesions    LABS:                        10.9   7.37  )-----------( 346      ( 06 Mar 2021 08:13 )             34.1     03-06    142  |  109<H>  |  6<L>  ----------------------------<  76  4.4   |  23  |  0.47<L>    Ca    8.4      06 Mar 2021 08:13  Phos  2.8     03-06  Mg     2.0     03-06    TPro  5.2<L>  /  Alb  2.3<L>  /  TBili  0.2  /  DBili  x   /  AST  18  /  ALT  6   /  AlkPhos  69  03-06    PT/INR - ( 05 Mar 2021 07:46 )   PT: 12.4 sec;   INR: 1.08 ratio         PTT - ( 05 Mar 2021 07:46 )  PTT:30.4 sec  CARDIAC MARKERS ( 05 Mar 2021 07:50 )  x     / x     / 31 U/L / x     / x                RADIOLOGY & ADDITIONAL TESTS:  Results Reviewed:   Imaging Personally Reviewed:  Electrocardiogram Personally Reviewed:    COORDINATION OF CARE:  Care Discussed with Consultants/Other Providers [Y/N]:  Prior or Outpatient Records Reviewed [Y/N]:   ***************************************************************  Nette Jaime, PGY1  Internal Medicine   pager: NS: [phone] [facility]: [phone]  ***************************************************************    PROGRESS NOTE:     Patient is a 65y old  Female who presents with a chief complaint of Bilateral LE weakness (proximal > distal) (06 Mar 2021 06:59)      SUBJECTIVE / OVERNIGHT EVENTS:  Patient had a number of PT sessions and required using a walker due to leg weakness. Today, her strength is improved in the hip flexors 4/5 and arm flexors 4/5. She is having normal BMs. She was unable to have an MRI due to claustrophobia and request for sedating medication.     MEDICATIONS  (STANDING):  cyanocobalamin 1000 MICROGram(s) Oral daily  enoxaparin Injectable 40 milliGRAM(s) SubCutaneous daily  FLUoxetine 20 milliGRAM(s) Oral daily  folic acid 1 milliGRAM(s) Oral daily  influenza   Vaccine 0.5 milliLiter(s) IntraMuscular once  melatonin 6 milliGRAM(s) Oral at bedtime  multivitamin 1 Tablet(s) Oral daily  polyethylene glycol 3350 17 Gram(s) Oral daily  senna 2 Tablet(s) Oral at bedtime  thiamine 100 milliGRAM(s) Oral daily    MEDICATIONS  (PRN):      CAPILLARY BLOOD GLUCOSE        I&O's Summary    06 Mar 2021 07:01  -  07 Mar 2021 05:51  --------------------------------------------------------  IN: 500 mL / OUT: 0 mL / NET: 500 mL        PHYSICAL EXAM:  Vital Signs Last 24 Hrs  T(C): 36.9 (07 Mar 2021 04:11), Max: 36.9 (06 Mar 2021 15:40)  T(F): 98.5 (07 Mar 2021 04:11), Max: 98.5 (07 Mar 2021 04:11)  HR: 98 (07 Mar 2021 04:11) (91 - 98)  BP: 135/80 (07 Mar 2021 04:11) (126/71 - 135/80)  BP(mean): --  RR: 18 (07 Mar 2021 04:11) (17 - 18)  SpO2: 95% (07 Mar 2021 04:11) (95% - 100%)    PHYSICAL EXAM:  GENERAL: NAD, well-groomed, well-developed  HEAD:  Atraumatic, Normocephalic  EYES: (+) xanthelasma. EOMI, PERRLA, conjunctiva and sclera clear.  ENMT: No tonsillar erythema, exudates, or enlargement; Moist mucous membranes, Good dentition, No lesions  NECK: Supple, No JVD, Normal thyroid  NERVOUS SYSTEM:  Alert & Oriented X3, Good concentration; Motor Strength 5/5 B/L upper. Lower extremities hip flexors 3/5, knee flexors 4/5, ankle flexors 5/5.  Sensation decreased R LE distally>proximally  CHEST/LUNG: Clear to percussion bilaterally; No rales, rhonchi, wheezing, or rubs  HEART: Regular rate and rhythm; No murmurs, rubs, or gallops  ABDOMEN: Soft, Nontender, Nondistended; Bowel sounds present  EXTREMITIES:  2+ Peripheral Pulses, No clubbing, cyanosis, or edema  LYMPH: No lymphadenopathy noted  SKIN: No rashes or lesions    LABS:                          10.7   6.95  )-----------( 344      ( 07 Mar 2021 07:34 )             32.5                           10.9   7.37  )-----------( 346      ( 06 Mar 2021 08:13 )             34.1     03-06    142  |  109<H>  |  6<L>  ----------------------------<  76  4.4   |  23  |  0.47<L>    Ca    8.4      06 Mar 2021 08:13  Phos  2.8     03-06  Mg     2.0     03-06    TPro  5.2<L>  /  Alb  2.3<L>  /  TBili  0.2  /  DBili  x   /  AST  18  /  ALT  6   /  AlkPhos  69  03-06    PT/INR - ( 05 Mar 2021 07:46 )   PT: 12.4 sec;   INR: 1.08 ratio         PTT - ( 05 Mar 2021 07:46 )  PTT:30.4 sec  CARDIAC MARKERS ( 05 Mar 2021 07:50 )  x     / x     / 31 U/L / x     / x                RADIOLOGY & ADDITIONAL TESTS:  Results Reviewed:   Imaging Personally Reviewed:  Electrocardiogram Personally Reviewed:    COORDINATION OF CARE:  Care Discussed with Consultants/Other Providers [Y/N]:  Prior or Outpatient Records Reviewed [Y/N]:   ***************************************************************  Nette Jaime, PGY1  Internal Medicine   pager: NS: [phone] [facility]: [phone]  ***************************************************************    PROGRESS NOTE:     Patient is a 65y old  Female who presents with a chief complaint of Bilateral LE weakness (proximal > distal) (06 Mar 2021 06:59)      SUBJECTIVE / OVERNIGHT EVENTS:  Patient had a number of PT sessions and required using a walker due to leg weakness. Today, her strength is improved in the hip flexors 4/5 and arm flexors 4/5. She is having normal BMs. She was unable to have an MRI due to claustrophobia and request for sedating medication.     MEDICATIONS  (STANDING):  cyanocobalamin 1000 MICROGram(s) Oral daily  enoxaparin Injectable 40 milliGRAM(s) SubCutaneous daily  FLUoxetine 20 milliGRAM(s) Oral daily  folic acid 1 milliGRAM(s) Oral daily  influenza   Vaccine 0.5 milliLiter(s) IntraMuscular once  melatonin 6 milliGRAM(s) Oral at bedtime  multivitamin 1 Tablet(s) Oral daily  polyethylene glycol 3350 17 Gram(s) Oral daily  senna 2 Tablet(s) Oral at bedtime  thiamine 100 milliGRAM(s) Oral daily    MEDICATIONS  (PRN):      CAPILLARY BLOOD GLUCOSE        I&O's Summary    06 Mar 2021 07:01  -  07 Mar 2021 05:51  --------------------------------------------------------  IN: 500 mL / OUT: 0 mL / NET: 500 mL        PHYSICAL EXAM:  Vital Signs Last 24 Hrs  T(C): 36.9 (07 Mar 2021 04:11), Max: 36.9 (06 Mar 2021 15:40)  T(F): 98.5 (07 Mar 2021 04:11), Max: 98.5 (07 Mar 2021 04:11)  HR: 98 (07 Mar 2021 04:11) (91 - 98)  BP: 135/80 (07 Mar 2021 04:11) (126/71 - 135/80)  BP(mean): --  RR: 18 (07 Mar 2021 04:11) (17 - 18)  SpO2: 95% (07 Mar 2021 04:11) (95% - 100%)    PHYSICAL EXAM:  GENERAL: NAD, well-groomed, well-developed  HEAD:  Atraumatic, Normocephalic  EYES: (+) xanthelasma. EOMI, PERRLA, conjunctiva and sclera clear.  ENMT: No tonsillar erythema, exudates, or enlargement; Moist mucous membranes, Good dentition, No lesions  NECK: Supple, No JVD, Normal thyroid  NERVOUS SYSTEM:  Alert & Oriented X3, Good concentration; Motor Strength 3-4/5 B/L upper prox, 5/5 wrist and hands. Lower extremities hip flexors 4/5, knee flexors 5/5, ankle flexors 5/5.  Sensation decreased R LE distally>proximally  CHEST/LUNG: Clear to percussion bilaterally; No rales, rhonchi, wheezing, or rubs  HEART: Regular rate and rhythm; No murmurs, rubs, or gallops  ABDOMEN: Soft, Nontender, Nondistended; Bowel sounds present  EXTREMITIES:  2+ Peripheral Pulses, No clubbing, cyanosis, or edema  LYMPH: No lymphadenopathy noted  SKIN: No rashes or lesions    LABS:                          10.7   6.95  )-----------( 344      ( 07 Mar 2021 07:34 )             32.5                           10.9   7.37  )-----------( 346      ( 06 Mar 2021 08:13 )             34.1     03-06    142  |  109<H>  |  6<L>  ----------------------------<  76  4.4   |  23  |  0.47<L>    Ca    8.4      06 Mar 2021 08:13  Phos  2.8     03-06  Mg     2.0     03-06    TPro  5.2<L>  /  Alb  2.3<L>  /  TBili  0.2  /  DBili  x   /  AST  18  /  ALT  6   /  AlkPhos  69  03-06    PT/INR - ( 05 Mar 2021 07:46 )   PT: 12.4 sec;   INR: 1.08 ratio         PTT - ( 05 Mar 2021 07:46 )  PTT:30.4 sec  CARDIAC MARKERS ( 05 Mar 2021 07:50 )  x     / x     / 31 U/L / x     / x                RADIOLOGY & ADDITIONAL TESTS:  Results Reviewed:   Imaging Personally Reviewed:  Electrocardiogram Personally Reviewed:    COORDINATION OF CARE:  Care Discussed with Consultants/Other Providers [Y/N]:  Prior or Outpatient Records Reviewed [Y/N]:

## 2021-03-07 NOTE — PROGRESS NOTE ADULT - PROBLEM SELECTOR PLAN 5
.5 on admission, likely 2/2 liver disease due to alcohol use d/o  - normal B12, f/u folate .5 on admission, likely 2/2 liver disease due to alcohol use d/o  - low/normal B12 (260), low folate (3.0)  - high homocysteine, f/u MMA  - continue with folate, B12 supplementation

## 2021-03-07 NOTE — PROGRESS NOTE ADULT - PROBLEM SELECTOR PLAN 1
Bilateral LE weakness started 2 months ago, prox > distal, equal bilaterally, no numbness or tingling and no imbalance.   - B12 low/normal, CK and TSH normal, CRP (40) and ESR (77) elevated, high homocysteine (81.2), negative Silica clotting and Joel's Viper Venom  - s/p thiamine 500 mg TID x 3 days   - started 100 mg daily, multivitamin  - PT consult  - neuro consulted: apprec recs  - pending MRI head and C-spine  - pending Myasthenia gravis labs, autoimmune labs, and vitamin/mineral def labs, folate  - Started cyanocobalamin 1000 mcg daily Bilateral LE weakness started 2 months ago, prox > distal, equal bilaterally, no numbness or tingling and no imbalance. Also, UE weakness prox > distal.  - B12 low/normal, CK and TSH normal, CRP (40) and ESR (77) elevated, high homocysteine (81.2), negative Silica clotting and Joel's Viper Venom  - s/p thiamine 500 mg TID x 3 days   - started 100 mg daily, multivitamin  - PT consult  - neuro consulted: apprec recs  - pending MRI head and C-spine  - negative autoimmune labs so far  - pending Myasthenia gravis labs, vitamin/mineral def labs  - Started cyanocobalamin 1000 mcg daily, and folic acid 1 mg daily

## 2021-03-07 NOTE — PROGRESS NOTE ADULT - ASSESSMENT
65 year old female with ETOH use disorder, depression p/w weakness likely 2/2 vitamin deficiency vs infectious vs neuromuscular disease vs autoimmune. 65 year old female with ETOH use disorder, depression p/w weakness likely 2/2 vitamin deficiency vs neuromuscular disease vs autoimmune.

## 2021-03-07 NOTE — PROGRESS NOTE ADULT - PROBLEM SELECTOR PLAN 4
Last drink was 5 weeks ago. Started drinking 5-6 yrs ago when mother was sick. She drinks 1 bottle of white wine daily. She checked herself into rehab two weeks ago.   - outpatient treatment for ETOH use disorder Last drink was 5 weeks ago. Started drinking 5-6 yrs ago when mother was sick. She drinks 1 bottle of white wine daily. She checked herself into rehab two weeks ago.   - outpatient treatment for ETOH use disorder - endorses wanting to quit drinking

## 2021-03-08 LAB
% ALBUMIN: 38.2 % — SIGNIFICANT CHANGE UP
% ALPHA 1: 6.5 % — SIGNIFICANT CHANGE UP
% ALPHA 2: 19.6 % — SIGNIFICANT CHANGE UP
% BETA: 21.8 % — SIGNIFICANT CHANGE UP
% GAMMA: 13.9 % — SIGNIFICANT CHANGE UP
ACE SERPL-CCNC: 58 U/L — SIGNIFICANT CHANGE UP (ref 14–82)
ACRM MODULATING ANTIBODY: <0.03 NMOL/L — SIGNIFICANT CHANGE UP (ref 0–0.24)
ALBUMIN SERPL ELPH-MCNC: 1.91 G/DL — LOW (ref 3.3–4.4)
ALBUMIN SERPL ELPH-MCNC: 2.3 G/DL — LOW (ref 3.3–5)
ALBUMIN/GLOB SERPL ELPH: 0.6 RATIO — SIGNIFICANT CHANGE UP
ALP SERPL-CCNC: 76 U/L — SIGNIFICANT CHANGE UP (ref 40–120)
ALPHA1 GLOB SERPL ELPH-MCNC: 0.32 G/DL — HIGH (ref 0.1–0.3)
ALPHA2 GLOB SERPL ELPH-MCNC: 1 G/DL — SIGNIFICANT CHANGE UP (ref 0.6–1)
ALT FLD-CCNC: 5 U/L — SIGNIFICANT CHANGE UP (ref 4–33)
ANA TITR SER: NEGATIVE — SIGNIFICANT CHANGE UP
ANION GAP SERPL CALC-SCNC: 9 MMOL/L — SIGNIFICANT CHANGE UP (ref 7–14)
AST SERPL-CCNC: 16 U/L — SIGNIFICANT CHANGE UP (ref 4–32)
B-GLOBULIN SERPL ELPH-MCNC: 1.09 G/DL — SIGNIFICANT CHANGE UP (ref 0.6–1.1)
BASOPHILS # BLD AUTO: 0.07 K/UL — SIGNIFICANT CHANGE UP (ref 0–0.2)
BASOPHILS NFR BLD AUTO: 0.9 % — SIGNIFICANT CHANGE UP (ref 0–2)
BILIRUB SERPL-MCNC: 0.3 MG/DL — SIGNIFICANT CHANGE UP (ref 0.2–1.2)
BUN SERPL-MCNC: 7 MG/DL — SIGNIFICANT CHANGE UP (ref 7–23)
CALCIUM SERPL-MCNC: 8.7 MG/DL — SIGNIFICANT CHANGE UP (ref 8.4–10.5)
CHLORIDE SERPL-SCNC: 106 MMOL/L — SIGNIFICANT CHANGE UP (ref 98–107)
CO2 SERPL-SCNC: 23 MMOL/L — SIGNIFICANT CHANGE UP (ref 22–31)
CREAT SERPL-MCNC: 0.5 MG/DL — SIGNIFICANT CHANGE UP (ref 0.5–1.3)
EOSINOPHIL # BLD AUTO: 0.2 K/UL — SIGNIFICANT CHANGE UP (ref 0–0.5)
EOSINOPHIL NFR BLD AUTO: 2.7 % — SIGNIFICANT CHANGE UP (ref 0–6)
GAMMA GLOBULIN: 0.7 G/DL — SIGNIFICANT CHANGE UP (ref 0.7–1.7)
GLUCOSE SERPL-MCNC: 80 MG/DL — SIGNIFICANT CHANGE UP (ref 70–99)
HCT VFR BLD CALC: 34.4 % — LOW (ref 34.5–45)
HGB BLD-MCNC: 11.4 G/DL — LOW (ref 11.5–15.5)
IANC: 4.67 K/UL — SIGNIFICANT CHANGE UP (ref 1.5–8.5)
LYMPHOCYTES # BLD AUTO: 1.21 K/UL — SIGNIFICANT CHANGE UP (ref 1–3.3)
LYMPHOCYTES # BLD AUTO: 16.1 % — SIGNIFICANT CHANGE UP (ref 13–44)
MAGNESIUM SERPL-MCNC: 2 MG/DL — SIGNIFICANT CHANGE UP (ref 1.6–2.6)
MCHC RBC-ENTMCNC: 33.1 GM/DL — SIGNIFICANT CHANGE UP (ref 32–36)
MCHC RBC-ENTMCNC: 35.5 PG — HIGH (ref 27–34)
MCV RBC AUTO: 107.2 FL — HIGH (ref 80–100)
MONOCYTES # BLD AUTO: 0.2 K/UL — SIGNIFICANT CHANGE UP (ref 0–0.9)
MONOCYTES NFR BLD AUTO: 2.7 % — SIGNIFICANT CHANGE UP (ref 2–14)
NEUTROPHILS # BLD AUTO: 5.5 K/UL — SIGNIFICANT CHANGE UP (ref 1.8–7.4)
NEUTROPHILS NFR BLD AUTO: 73.2 % — SIGNIFICANT CHANGE UP (ref 43–77)
PHOSPHATE SERPL-MCNC: 4 MG/DL — SIGNIFICANT CHANGE UP (ref 2.5–4.5)
PLATELET # BLD AUTO: 374 K/UL — SIGNIFICANT CHANGE UP (ref 150–400)
POTASSIUM SERPL-MCNC: 4.2 MMOL/L — SIGNIFICANT CHANGE UP (ref 3.5–5.3)
POTASSIUM SERPL-SCNC: 4.2 MMOL/L — SIGNIFICANT CHANGE UP (ref 3.5–5.3)
PROT PATTERN SERPL ELPH-IMP: SIGNIFICANT CHANGE UP
PROT SERPL-MCNC: 5 G/DL — SIGNIFICANT CHANGE UP
PROT SERPL-MCNC: 5.4 G/DL — LOW (ref 6–8.3)
RBC # BLD: 3.21 M/UL — LOW (ref 3.8–5.2)
RBC # FLD: 16.3 % — HIGH (ref 10.3–14.5)
SARS-COV-2 RNA SPEC QL NAA+PROBE: SIGNIFICANT CHANGE UP
SODIUM SERPL-SCNC: 138 MMOL/L — SIGNIFICANT CHANGE UP (ref 135–145)
WBC # BLD: 7.51 K/UL — SIGNIFICANT CHANGE UP (ref 3.8–10.5)
WBC # FLD AUTO: 7.51 K/UL — SIGNIFICANT CHANGE UP (ref 3.8–10.5)

## 2021-03-08 PROCEDURE — 70553 MRI BRAIN STEM W/O & W/DYE: CPT | Mod: 26

## 2021-03-08 PROCEDURE — 99232 SBSQ HOSP IP/OBS MODERATE 35: CPT | Mod: GC

## 2021-03-08 PROCEDURE — 72156 MRI NECK SPINE W/O & W/DYE: CPT | Mod: 26

## 2021-03-08 RX ADMIN — ENOXAPARIN SODIUM 40 MILLIGRAM(S): 100 INJECTION SUBCUTANEOUS at 11:47

## 2021-03-08 RX ADMIN — Medication 0.5 MILLIGRAM(S): at 20:05

## 2021-03-08 RX ADMIN — Medication 20 MILLIGRAM(S): at 11:50

## 2021-03-08 RX ADMIN — POLYETHYLENE GLYCOL 3350 17 GRAM(S): 17 POWDER, FOR SOLUTION ORAL at 11:47

## 2021-03-08 RX ADMIN — Medication 6 MILLIGRAM(S): at 23:00

## 2021-03-08 RX ADMIN — Medication 1 MILLIGRAM(S): at 11:48

## 2021-03-08 RX ADMIN — Medication 100 MILLIGRAM(S): at 11:48

## 2021-03-08 RX ADMIN — PREGABALIN 1000 MICROGRAM(S): 225 CAPSULE ORAL at 11:49

## 2021-03-08 RX ADMIN — Medication 1 TABLET(S): at 11:49

## 2021-03-08 NOTE — PROGRESS NOTE ADULT - PROBLEM SELECTOR PLAN 5
.5 on admission, likely 2/2 liver disease due to alcohol use d/o  - low/normal B12 (260), low folate (3.0)  - high homocysteine, f/u MMA  - continue with folate, B12 supplementation

## 2021-03-08 NOTE — PROGRESS NOTE ADULT - PROBLEM SELECTOR PLAN 1
Bilateral LE weakness started 2 months ago, prox > distal, equal bilaterally, no numbness or tingling and no imbalance. Also, UE weakness prox > distal.  - B12 low/normal, CK and TSH normal, CRP (40) and ESR (77) elevated, high homocysteine (81.2), negative Silica clotting and Joel's Viper Venom  - s/p thiamine 500 mg TID x 3 days   - started 100 mg daily, multivitamin  - PT consult  - neuro consulted: apprec recs  - pending MRI head and C-spine  - negative autoimmune labs so far  - pending Myasthenia gravis labs, vitamin/mineral def labs  - Started cyanocobalamin 1000 mcg daily, and folic acid 1 mg daily Bilateral LE weakness started 2 months ago, prox > distal, equal bilaterally, no numbness or tingling and no imbalance. Also, UE weakness prox > distal.  - B12 low/normal, CK and TSH normal, CRP (40) and ESR (77) elevated, high homocysteine (81.2), negative Silica clotting and Joel's Viper Venom  - s/p thiamine 500 mg TID x 3 days   - c/w thiamine 100 mg daily, multivitamin, folate and cyanocobalamin 1000 mcg daily  - PT consult  - neuro consulted: apprec recs  - pending MRI head and C-spine  - negative autoimmune labs so far  - pending Myasthenia gravis labs, vitamin/mineral def labs

## 2021-03-08 NOTE — PROGRESS NOTE ADULT - ASSESSMENT
65 year old female with ETOH use disorder, depression p/w weakness likely 2/2 vitamin deficiency vs neuromuscular disease vs autoimmune.

## 2021-03-08 NOTE — PROGRESS NOTE ADULT - SUBJECTIVE AND OBJECTIVE BOX
***************************************************************  Netteseth Jaime, PGY1  Internal Medicine   pager: NS: [phone] [facility]: [phone]  ***************************************************************    PROGRESS NOTE:     Patient is a 65y old  Female who presents with a chief complaint of Bilateral LE weakness (proximal > distal) (07 Mar 2021 05:51)      SUBJECTIVE / OVERNIGHT EVENTS:      MEDICATIONS  (STANDING):  cyanocobalamin 1000 MICROGram(s) Oral daily  enoxaparin Injectable 40 milliGRAM(s) SubCutaneous daily  FLUoxetine 20 milliGRAM(s) Oral daily  folic acid 1 milliGRAM(s) Oral daily  folic acid 1 milliGRAM(s) Oral daily  influenza   Vaccine 0.5 milliLiter(s) IntraMuscular once  LORazepam   Injectable 0.5 milliGRAM(s) IV Push once  melatonin 6 milliGRAM(s) Oral at bedtime  multivitamin 1 Tablet(s) Oral daily  polyethylene glycol 3350 17 Gram(s) Oral daily  senna 2 Tablet(s) Oral at bedtime  thiamine 100 milliGRAM(s) Oral daily    MEDICATIONS  (PRN):      CAPILLARY BLOOD GLUCOSE        I&O's Summary    06 Mar 2021 07:01  -  07 Mar 2021 07:00  --------------------------------------------------------  IN: 740 mL / OUT: 0 mL / NET: 740 mL        PHYSICAL EXAM:  Vital Signs Last 24 Hrs  T(C): 36.7 (08 Mar 2021 04:35), Max: 37.1 (07 Mar 2021 21:05)  T(F): 98 (08 Mar 2021 04:35), Max: 98.7 (07 Mar 2021 21:05)  HR: 90 (08 Mar 2021 04:35) (90 - 98)  BP: 147/95 (08 Mar 2021 04:35) (129/84 - 147/95)  BP(mean): --  RR: 19 (08 Mar 2021 04:35) (18 - 19)  SpO2: 95% (08 Mar 2021 04:35) (95% - 96%)    PHYSICAL EXAM:  GENERAL: NAD, well-groomed, well-developed  HEAD:  Atraumatic, Normocephalic  EYES: (+) xanthelasma. EOMI, PERRLA, conjunctiva and sclera clear.  ENT: No tonsillar erythema, exudates, or enlargement; Moist mucous membranes, Good dentition, No lesions  NECK: Supple, No JVD, Normal thyroid  NERVOUS SYSTEM:  Alert & Oriented X3, Good concentration; Motor Strength 3-4/5 B/L upper prox, 5/5 wrist and hands. Lower extremities hip flexors 4/5, knee flexors 5/5, ankle flexors 5/5.  Sensation decreased R LE distally>proximally  CHEST/LUNG: Clear to percussion bilaterally; No rales, rhonchi, wheezing, or rubs  HEART: Regular rate and rhythm; No murmurs, rubs, or gallops  ABDOMEN: Soft, Nontender, Nondistended; Bowel sounds present  EXTREMITIES:  2+ Peripheral Pulses, No clubbing, cyanosis, or edema  LYMPH: No lymphadenopathy noted  SKIN: No rashes or lesions    LABS:                        10.7   6.95  )-----------( 344      ( 07 Mar 2021 07:34 )             32.5     03-07    141  |  109<H>  |  6<L>  ----------------------------<  81  4.3   |  23  |  0.49<L>    Ca    8.6      07 Mar 2021 07:34  Phos  3.4     03-07  Mg     1.9     03-07    TPro  5.2<L>  /  Alb  2.3<L>  /  TBili  0.2  /  DBili  x   /  AST  18  /  ALT  6   /  AlkPhos  69  03-06                RADIOLOGY & ADDITIONAL TESTS:  Results Reviewed:   Imaging Personally Reviewed:  Electrocardiogram Personally Reviewed:    COORDINATION OF CARE:  Care Discussed with Consultants/Other Providers [Y/N]:  Prior or Outpatient Records Reviewed [Y/N]:   ***************************************************************  Nette Jaime, PGY1  Internal Medicine   pager: NS: [phone] [facility]: [phone]  ***************************************************************    PROGRESS NOTE:     Patient is a 65y old  Female who presents with a chief complaint of Bilateral LE weakness (proximal > distal) (07 Mar 2021 05:51)      SUBJECTIVE / OVERNIGHT EVENTS:  Patient had no acute events overnight. She feels much better, and says her leg and arm strength has improved dramatically since arriving in the hospital. She has selected 3 rehab facilities with her family and feels ready to go.    MEDICATIONS  (STANDING):  cyanocobalamin 1000 MICROGram(s) Oral daily  enoxaparin Injectable 40 milliGRAM(s) SubCutaneous daily  FLUoxetine 20 milliGRAM(s) Oral daily  folic acid 1 milliGRAM(s) Oral daily  folic acid 1 milliGRAM(s) Oral daily  influenza   Vaccine 0.5 milliLiter(s) IntraMuscular once  LORazepam   Injectable 0.5 milliGRAM(s) IV Push once  melatonin 6 milliGRAM(s) Oral at bedtime  multivitamin 1 Tablet(s) Oral daily  polyethylene glycol 3350 17 Gram(s) Oral daily  senna 2 Tablet(s) Oral at bedtime  thiamine 100 milliGRAM(s) Oral daily    MEDICATIONS  (PRN):      CAPILLARY BLOOD GLUCOSE        I&O's Summary    06 Mar 2021 07:01  -  07 Mar 2021 07:00  --------------------------------------------------------  IN: 740 mL / OUT: 0 mL / NET: 740 mL        PHYSICAL EXAM:  Vital Signs Last 24 Hrs  T(C): 36.7 (08 Mar 2021 04:35), Max: 37.1 (07 Mar 2021 21:05)  T(F): 98 (08 Mar 2021 04:35), Max: 98.7 (07 Mar 2021 21:05)  HR: 90 (08 Mar 2021 04:35) (90 - 98)  BP: 147/95 (08 Mar 2021 04:35) (129/84 - 147/95)  BP(mean): --  RR: 19 (08 Mar 2021 04:35) (18 - 19)  SpO2: 95% (08 Mar 2021 04:35) (95% - 96%)    PHYSICAL EXAM:  GENERAL: NAD, well-groomed, well-developed  HEAD:  Atraumatic, Normocephalic  EYES: (+) xanthelasma. EOMI, PERRLA, conjunctiva and sclera clear.  ENT: No tonsillar erythema, exudates, or enlargement; Moist mucous membranes, Good dentition, No lesions  NECK: Supple, No JVD, Normal thyroid  NERVOUS SYSTEM:  Alert & Oriented X3, Good concentration; Motor Strength 3-4/5 B/L upper prox, 5/5 wrist and hands. Lower extremities hip flexors 5/5, knee flexors 5/5, ankle flexors 5/5.  Sensation decreased R LE distally>proximally  CHEST/LUNG: Clear to percussion bilaterally; No rales, rhonchi, wheezing, or rubs  HEART: Regular rate and rhythm; No murmurs, rubs, or gallops  ABDOMEN: Soft, Nontender, Nondistended; Bowel sounds present  EXTREMITIES:  2+ Peripheral Pulses, No clubbing, cyanosis, or edema  LYMPH: No lymphadenopathy noted  SKIN: No rashes or lesions    LABS:                        10.7   6.95  )-----------( 344      ( 07 Mar 2021 07:34 )             32.5     03-07    141  |  109<H>  |  6<L>  ----------------------------<  81  4.3   |  23  |  0.49<L>    Ca    8.6      07 Mar 2021 07:34  Phos  3.4     03-07  Mg     1.9     03-07    TPro  5.2<L>  /  Alb  2.3<L>  /  TBili  0.2  /  DBili  x   /  AST  18  /  ALT  6   /  AlkPhos  69  03-06                RADIOLOGY & ADDITIONAL TESTS:  Results Reviewed:   Imaging Personally Reviewed:  Electrocardiogram Personally Reviewed:    COORDINATION OF CARE:  Care Discussed with Consultants/Other Providers [Y/N]:  Prior or Outpatient Records Reviewed [Y/N]:   ***************************************************************  Nette Jaime, PGY1  Internal Medicine   pager: NS: [phone] [facility]: [phone]  ***************************************************************    PROGRESS NOTE:     Patient is a 65y old  Female who presents with a chief complaint of Bilateral LE weakness (proximal > distal) (07 Mar 2021 05:51)      SUBJECTIVE / OVERNIGHT EVENTS:  Patient had no acute events overnight. She feels much better, and says her leg and arm strength has improved dramatically since arriving in the hospital. She has selected 3 rehab facilities with her family and feels ready to go.    MEDICATIONS  (STANDING):  cyanocobalamin 1000 MICROGram(s) Oral daily  enoxaparin Injectable 40 milliGRAM(s) SubCutaneous daily  FLUoxetine 20 milliGRAM(s) Oral daily  folic acid 1 milliGRAM(s) Oral daily  folic acid 1 milliGRAM(s) Oral daily  influenza   Vaccine 0.5 milliLiter(s) IntraMuscular once  LORazepam   Injectable 0.5 milliGRAM(s) IV Push once  melatonin 6 milliGRAM(s) Oral at bedtime  multivitamin 1 Tablet(s) Oral daily  polyethylene glycol 3350 17 Gram(s) Oral daily  senna 2 Tablet(s) Oral at bedtime  thiamine 100 milliGRAM(s) Oral daily    MEDICATIONS  (PRN):      CAPILLARY BLOOD GLUCOSE        I&O's Summary    06 Mar 2021 07:01  -  07 Mar 2021 07:00  --------------------------------------------------------  IN: 740 mL / OUT: 0 mL / NET: 740 mL        PHYSICAL EXAM:  Vital Signs Last 24 Hrs  T(C): 36.7 (08 Mar 2021 04:35), Max: 37.1 (07 Mar 2021 21:05)  T(F): 98 (08 Mar 2021 04:35), Max: 98.7 (07 Mar 2021 21:05)  HR: 90 (08 Mar 2021 04:35) (90 - 98)  BP: 147/95 (08 Mar 2021 04:35) (129/84 - 147/95)  BP(mean): --  RR: 19 (08 Mar 2021 04:35) (18 - 19)  SpO2: 95% (08 Mar 2021 04:35) (95% - 96%)    PHYSICAL EXAM:  GENERAL: NAD, well-groomed, well-developed  HEAD:  Atraumatic, Normocephalic  EYES: (+) xanthelasma. EOMI, PERRLA, conjunctiva and sclera clear.  ENT: No tonsillar erythema, exudates, or enlargement; Moist mucous membranes, Good dentition, No lesions  NECK: Supple, No JVD, Normal thyroid  NERVOUS SYSTEM:  Alert & Oriented X3, Good concentration; Motor Strength 3-4/5 B/L upper prox, 5/5 wrist and hands. Lower extremities hip flexors 5/5, knee flexors 5/5, ankle flexors 5/5.  Sensation decreased R LE distally>proximally  CHEST/LUNG: Clear to percussion bilaterally; No rales, rhonchi, wheezing, or rubs  HEART: Regular rate and rhythm; No murmurs, rubs, or gallops  ABDOMEN: Soft, Nontender, Nondistended; Bowel sounds present  EXTREMITIES:  2+ Peripheral Pulses, No clubbing, cyanosis, or edema  LYMPH: No lymphadenopathy noted  SKIN: No rashes or lesions    LABS:                        10.7   6.95  )-----------( 344      ( 07 Mar 2021 07:34 )             32.5     03-07    141  |  109<H>  |  6<L>  ----------------------------<  81  4.3   |  23  |  0.49<L>    Ca    8.6      07 Mar 2021 07:34  Phos  3.4     03-07  Mg     1.9     03-07    TPro  5.2<L>  /  Alb  2.3<L>  /  TBili  0.2  /  DBili  x   /  AST  18  /  ALT  6   /  AlkPhos  69  03-06     No

## 2021-03-08 NOTE — PROGRESS NOTE ADULT - PROBLEM SELECTOR PLAN 4
Last drink was 5 weeks ago. Started drinking 5-6 yrs ago when mother was sick. She drinks 1 bottle of white wine daily. She checked herself into rehab two weeks ago.   - outpatient treatment for ETOH use disorder - endorses wanting to quit drinking

## 2021-03-09 ENCOUNTER — TRANSCRIPTION ENCOUNTER (OUTPATIENT)
Age: 66
End: 2021-03-09

## 2021-03-09 LAB
ACHR BLOCK AB SER-ACNC: 13 % — SIGNIFICANT CHANGE UP (ref 0–25)
ALBUMIN SERPL ELPH-MCNC: 2.3 G/DL — LOW (ref 3.3–5)
ALP SERPL-CCNC: 73 U/L — SIGNIFICANT CHANGE UP (ref 40–120)
ALT FLD-CCNC: 5 U/L — SIGNIFICANT CHANGE UP (ref 4–33)
ANION GAP SERPL CALC-SCNC: 10 MMOL/L — SIGNIFICANT CHANGE UP (ref 7–14)
AST SERPL-CCNC: 14 U/L — SIGNIFICANT CHANGE UP (ref 4–32)
AUTO DIFF PNL BLD: ABNORMAL
BASOPHILS # BLD AUTO: 0.03 K/UL — SIGNIFICANT CHANGE UP (ref 0–0.2)
BASOPHILS NFR BLD AUTO: 0.4 % — SIGNIFICANT CHANGE UP (ref 0–2)
BILIRUB SERPL-MCNC: 0.2 MG/DL — SIGNIFICANT CHANGE UP (ref 0.2–1.2)
BUN SERPL-MCNC: 11 MG/DL — SIGNIFICANT CHANGE UP (ref 7–23)
C-ANCA SER-ACNC: NEGATIVE — SIGNIFICANT CHANGE UP
CALCIUM SERPL-MCNC: 8.9 MG/DL — SIGNIFICANT CHANGE UP (ref 8.4–10.5)
CHLORIDE SERPL-SCNC: 106 MMOL/L — SIGNIFICANT CHANGE UP (ref 98–107)
CO2 SERPL-SCNC: 23 MMOL/L — SIGNIFICANT CHANGE UP (ref 22–31)
COPPER SERPL-MCNC: 116 UG/DL — SIGNIFICANT CHANGE UP (ref 80–158)
CREAT SERPL-MCNC: 0.54 MG/DL — SIGNIFICANT CHANGE UP (ref 0.5–1.3)
EOSINOPHIL # BLD AUTO: 0.36 K/UL — SIGNIFICANT CHANGE UP (ref 0–0.5)
EOSINOPHIL NFR BLD AUTO: 5.2 % — SIGNIFICANT CHANGE UP (ref 0–6)
GLUCOSE SERPL-MCNC: 84 MG/DL — SIGNIFICANT CHANGE UP (ref 70–99)
HCT VFR BLD CALC: 33.5 % — LOW (ref 34.5–45)
HGB BLD-MCNC: 11.3 G/DL — LOW (ref 11.5–15.5)
IANC: 4.06 K/UL — SIGNIFICANT CHANGE UP (ref 1.5–8.5)
IMM GRANULOCYTES NFR BLD AUTO: 0.4 % — SIGNIFICANT CHANGE UP (ref 0–1.5)
LYMPHOCYTES # BLD AUTO: 1.94 K/UL — SIGNIFICANT CHANGE UP (ref 1–3.3)
LYMPHOCYTES # BLD AUTO: 27.8 % — SIGNIFICANT CHANGE UP (ref 13–44)
MAGNESIUM SERPL-MCNC: 2 MG/DL — SIGNIFICANT CHANGE UP (ref 1.6–2.6)
MCHC RBC-ENTMCNC: 33.7 GM/DL — SIGNIFICANT CHANGE UP (ref 32–36)
MCHC RBC-ENTMCNC: 35.6 PG — HIGH (ref 27–34)
MCV RBC AUTO: 105.7 FL — HIGH (ref 80–100)
MONOCYTES # BLD AUTO: 0.56 K/UL — SIGNIFICANT CHANGE UP (ref 0–0.9)
MONOCYTES NFR BLD AUTO: 8 % — SIGNIFICANT CHANGE UP (ref 2–14)
NEUTROPHILS # BLD AUTO: 4.06 K/UL — SIGNIFICANT CHANGE UP (ref 1.8–7.4)
NEUTROPHILS NFR BLD AUTO: 58.2 % — SIGNIFICANT CHANGE UP (ref 43–77)
NRBC # BLD: 0 /100 WBCS — SIGNIFICANT CHANGE UP
NRBC # FLD: 0 K/UL — SIGNIFICANT CHANGE UP
P-ANCA SER-ACNC: NEGATIVE — SIGNIFICANT CHANGE UP
PHOSPHATE SERPL-MCNC: 4 MG/DL — SIGNIFICANT CHANGE UP (ref 2.5–4.5)
PLATELET # BLD AUTO: 370 K/UL — SIGNIFICANT CHANGE UP (ref 150–400)
POTASSIUM SERPL-MCNC: 3.8 MMOL/L — SIGNIFICANT CHANGE UP (ref 3.5–5.3)
POTASSIUM SERPL-SCNC: 3.8 MMOL/L — SIGNIFICANT CHANGE UP (ref 3.5–5.3)
PROT SERPL-MCNC: 5.3 G/DL — LOW (ref 6–8.3)
RBC # BLD: 3.17 M/UL — LOW (ref 3.8–5.2)
RBC # FLD: 16.4 % — HIGH (ref 10.3–14.5)
SODIUM SERPL-SCNC: 139 MMOL/L — SIGNIFICANT CHANGE UP (ref 135–145)
WBC # BLD: 6.98 K/UL — SIGNIFICANT CHANGE UP (ref 3.8–10.5)
WBC # FLD AUTO: 6.98 K/UL — SIGNIFICANT CHANGE UP (ref 3.8–10.5)

## 2021-03-09 PROCEDURE — 99233 SBSQ HOSP IP/OBS HIGH 50: CPT | Mod: GC

## 2021-03-09 PROCEDURE — 74177 CT ABD & PELVIS W/CONTRAST: CPT | Mod: 26

## 2021-03-09 PROCEDURE — 71260 CT THORAX DX C+: CPT | Mod: 26

## 2021-03-09 RX ADMIN — Medication 20 MILLIGRAM(S): at 11:53

## 2021-03-09 RX ADMIN — POLYETHYLENE GLYCOL 3350 17 GRAM(S): 17 POWDER, FOR SOLUTION ORAL at 11:53

## 2021-03-09 RX ADMIN — Medication 0.5 MILLIGRAM(S): at 17:26

## 2021-03-09 RX ADMIN — Medication 6 MILLIGRAM(S): at 21:17

## 2021-03-09 RX ADMIN — ENOXAPARIN SODIUM 40 MILLIGRAM(S): 100 INJECTION SUBCUTANEOUS at 11:53

## 2021-03-09 RX ADMIN — SENNA PLUS 2 TABLET(S): 8.6 TABLET ORAL at 21:17

## 2021-03-09 RX ADMIN — PREGABALIN 1000 MICROGRAM(S): 225 CAPSULE ORAL at 11:53

## 2021-03-09 RX ADMIN — Medication 1 MILLIGRAM(S): at 11:53

## 2021-03-09 RX ADMIN — Medication 100 MILLIGRAM(S): at 11:53

## 2021-03-09 RX ADMIN — Medication 1 TABLET(S): at 11:53

## 2021-03-09 NOTE — PROGRESS NOTE ADULT - PROBLEM SELECTOR PLAN 1
Bilateral LE weakness started 2 months ago, prox > distal, equal bilaterally, no numbness or tingling and no imbalance. Also, UE weakness prox > distal.  - B12 low/normal, CK and TSH normal, CRP (40) and ESR (77) elevated, high homocysteine (81.2), negative Silica clotting and Joel's Viper Venom  - s/p thiamine 500 mg TID x 3 days   - c/w thiamine 100 mg daily, multivitamin, folate and cyanocobalamin 1000 mcg daily  - PT consult  - neuro consulted: apprec recs  - pending MRI head and C-spine  - negative autoimmune labs so far  - pending Myasthenia gravis labs, vitamin/mineral def labs Bilateral LE weakness started 2 months ago, prox > distal, equal bilaterally, no numbness or tingling and no imbalance. Also, UE weakness prox > distal.  - B12 low/normal, CK and TSH normal, CRP (40) and ESR (77) elevated, high homocysteine (81.2), negative Silica clotting and Joel's Viper Venom  - s/p thiamine 500 mg TID x 3 days   - c/w thiamine 100 mg daily, multivitamin, folate and cyanocobalamin 1000 mcg daily  - PT consult  - neuro consulted: apprec recs  - MRI head and C-spine c/f MS vs metastatic disease (3/9)  - negative autoimmune labs so far  - pending Myasthenia gravis labs, vitamin/mineral def labs Bilateral LE weakness started 2 months ago, prox > distal, equal bilaterally, no numbness or tingling and no imbalance. Also, UE weakness prox > distal.  - B12 low/normal, CK and TSH normal, CRP (40) and ESR (77) elevated, high homocysteine (81.2), negative Silica clotting and Joel's Viper Venom  - s/p thiamine 500 mg TID x 3 days   - c/w thiamine 100 mg daily, multivitamin, folate and cyanocobalamin 1000 mcg daily  - PT consult  - neuro consulted: apprec recs  - MRI head and C-spine c/f MS vs metastatic disease (3/9)  - ordered CT a/p and LP  - negative autoimmune labs so far  - pending Myasthenia gravis labs, vitamin/mineral def labs

## 2021-03-09 NOTE — PROGRESS NOTE ADULT - SUBJECTIVE AND OBJECTIVE BOX
SERGEI DINH  Female  MRN-[id number]    Interval:  No acute events overnight. Patient has slightly improved strength from yesterday. MRI spine showed degenerative changes w/ multiple bulging discs.    VITAL SIGNS:  T(F): 97.8  HR: 97  BP: 128/88  RR: 18  SpO2: 98%    Exam:   MS: Oriented x3. Fluent. Follows crossed commands.   CN: VFF. EOMI. V1-V3 intact. Face symmetric. T/u midline.   Motor: Full strength throughout.   Sensory: Intact to LT throughout   Reflexes: 2+ throughout. Babinski absent bilaterally.   Coordination: No dysmetria on FNF or ataxia on HTS bilaterally   Gait: Deferred    LABS:                          11.3   6.98  )-----------( 370      ( 09 Mar 2021 06:53 )             33.5     03-09    139  |  106  |  11  ----------------------------<  84  3.8   |  23  |  0.54    Ca    8.9      09 Mar 2021 06:53  Phos  4.0     03-09  Mg     2.0     03-09    TPro  5.3<L>  /  Alb  2.3<L>  /  TBili  0.2  /  DBili  x   /  AST  14  /  ALT  5   /  AlkPhos  73  03-09        MEDICATIONS:   cyanocobalamin 1000 MICROGram(s) Oral daily  enoxaparin Injectable 40 milliGRAM(s) SubCutaneous daily  FLUoxetine 20 milliGRAM(s) Oral daily  folic acid 1 milliGRAM(s) Oral daily  influenza   Vaccine 0.5 milliLiter(s) IntraMuscular once  melatonin 6 milliGRAM(s) Oral at bedtime  multivitamin 1 Tablet(s) Oral daily  polyethylene glycol 3350 17 Gram(s) Oral daily  senna 2 Tablet(s) Oral at bedtime  thiamine 100 milliGRAM(s) Oral daily          RADIOLOGY & ADDITIONAL STUDIES:      EXAM:  MR BRAIN [facility]        PROCEDURE DATE:  Mar  8 2021         INTERPRETATION:  Clinical indications: Proximal muscle weakness.    MRI of the brain was performed using sagittal T1, axial T1 T2 T2 FLAIR diffusion and susceptibility weighted sequence. The patient was injected with approximately 9 cc of Gadavist IV with 1 cc of contrast discarded. Sagittal coronal and axial T1-weighted sequences were performed.    Parenchymal volume loss is identified.    Abnormal T2 prolongation in the periventricular white matter region is identified. Quite a few of these lesions in the bilateral centrum semiovale region do demonstrate restricted diffusion. These findings could be compatible with acute infarcts thoughthe possibility of active demyelinating lesions cannot be entirely excluded in the correct clinical setting Clinical correlation is recommended. Short-term follow-up is recommended as infarcts would be expected to involve on the DWI sequence in a short period of time. There is no evidence acute hemorrhage mass or mass effect.    The large vessels demonstrate normal flow voids    The paranasal sinuses appear clear    Inflammatory change seen involving both mastoid and middle ear regions.    IMPRESSION: Abnormal T2 prolongation in the periatrial white matter region. Quite a few these lesions in the bilateral centrum semiovale region to demonstrate restricted diffusion as described above.    MRI of the cervical spine was performed using sagittalT1-T2 and STIR sequence. Axial T1 and T2-weighted sequences were performed as well. The patient was injected with Gadavist IV and sagittal T1-weighted sequence performed fat suppression. Axial T1-weighted sequences were performed.    Loss of the normal cervical lordosis is seen. This could be due to positioning or muscle spasm.    The vertebral body height alignment and marrow signal appear normal.    Disc desiccation is seen throughout cervical spine region which is secondary to chronic degenerative change.    C2-3: Mild central disc protrusion is seen without significant compromise of the spinal canal or either neural foramen.    C3-4: Hypertrophic change is seen involving both facet and uncovertebral joints. Moderate narrowing of the rightneural foramen and severe narrowing of the left neural foramen.    C4-5: Bilateral hypertrophic facet changes seen. Moderate narrowing of the left neural foramen moderate to severe narrowing of the right neural foramen.    C5-6: Disc bulge seen. Hypertrophic change is seen involving both facet and right uncovertebral joints. Mild to moderate narrowing of the spinal canal. Severe narrowing of the right neural foramen.    C6-7: Disc bulge and bilateral hypertrophic facet joint changes seen. Moderate to severe narrowing of both neural foramen    C7-T1: Small bilateral nerve root sleeve cysts are seen.    The spinal cord demonstrates normal signal and caliber.      No abnormal areas of enhancement is seen.    IMPRESSION: Degenerative changes as described above.         SERGEI DINH  Female  MRN-[id number]    Interval:  No acute events overnight. Patient has slightly improved strength from yesterday. MRI Brain shows b/l T2 prolongation with slight enhancement. MR spine showed degenerative changes w/ multiple bulging discs.    VITAL SIGNS:  T(F): 97.8  HR: 97  BP: 128/88  RR: 18  SpO2: 98%    Exam:   MS: Oriented x3. Fluent. Follows crossed commands.   CN: VFF. EOMI. V1-V3 intact. Face symmetric. T/u midline.   Motor: Full strength throughout.   Sensory: Intact to LT throughout   Reflexes: 2+ throughout. Babinski absent bilaterally.   Coordination: No dysmetria on FNF or ataxia on HTS bilaterally   Gait: Deferred    LABS:                          11.3   6.98  )-----------( 370      ( 09 Mar 2021 06:53 )             33.5     03-09    139  |  106  |  11  ----------------------------<  84  3.8   |  23  |  0.54    Ca    8.9      09 Mar 2021 06:53  Phos  4.0     03-09  Mg     2.0     03-09    TPro  5.3<L>  /  Alb  2.3<L>  /  TBili  0.2  /  DBili  x   /  AST  14  /  ALT  5   /  AlkPhos  73  03-09        MEDICATIONS:   cyanocobalamin 1000 MICROGram(s) Oral daily  enoxaparin Injectable 40 milliGRAM(s) SubCutaneous daily  FLUoxetine 20 milliGRAM(s) Oral daily  folic acid 1 milliGRAM(s) Oral daily  influenza   Vaccine 0.5 milliLiter(s) IntraMuscular once  melatonin 6 milliGRAM(s) Oral at bedtime  multivitamin 1 Tablet(s) Oral daily  polyethylene glycol 3350 17 Gram(s) Oral daily  senna 2 Tablet(s) Oral at bedtime  thiamine 100 milliGRAM(s) Oral daily          RADIOLOGY & ADDITIONAL STUDIES:      EXAM:  MR BRAIN [facility]        PROCEDURE DATE:  Mar  8 2021         INTERPRETATION:  Clinical indications: Proximal muscle weakness.    MRI of the brain was performed using sagittal T1, axial T1 T2 T2 FLAIR diffusion and susceptibility weighted sequence. The patient was injected with approximately 9 cc of Gadavist IV with 1 cc of contrast discarded. Sagittal coronal and axial T1-weighted sequences were performed.    Parenchymal volume loss is identified.    Abnormal T2 prolongation in the periventricular white matter region is identified. Quite a few of these lesions in the bilateral centrum semiovale region do demonstrate restricted diffusion. These findings could be compatible with acute infarcts thoughthe possibility of active demyelinating lesions cannot be entirely excluded in the correct clinical setting Clinical correlation is recommended. Short-term follow-up is recommended as infarcts would be expected to involve on the DWI sequence in a short period of time. There is no evidence acute hemorrhage mass or mass effect.    The large vessels demonstrate normal flow voids    The paranasal sinuses appear clear    Inflammatory change seen involving both mastoid and middle ear regions.    IMPRESSION: Abnormal T2 prolongation in the periatrial white matter region. Quite a few these lesions in the bilateral centrum semiovale region to demonstrate restricted diffusion as described above.    MRI of the cervical spine was performed using sagittalT1-T2 and STIR sequence. Axial T1 and T2-weighted sequences were performed as well. The patient was injected with Gadavist IV and sagittal T1-weighted sequence performed fat suppression. Axial T1-weighted sequences were performed.    Loss of the normal cervical lordosis is seen. This could be due to positioning or muscle spasm.    The vertebral body height alignment and marrow signal appear normal.    Disc desiccation is seen throughout cervical spine region which is secondary to chronic degenerative change.    C2-3: Mild central disc protrusion is seen without significant compromise of the spinal canal or either neural foramen.    C3-4: Hypertrophic change is seen involving both facet and uncovertebral joints. Moderate narrowing of the rightneural foramen and severe narrowing of the left neural foramen.    C4-5: Bilateral hypertrophic facet changes seen. Moderate narrowing of the left neural foramen moderate to severe narrowing of the right neural foramen.    C5-6: Disc bulge seen. Hypertrophic change is seen involving both facet and right uncovertebral joints. Mild to moderate narrowing of the spinal canal. Severe narrowing of the right neural foramen.    C6-7: Disc bulge and bilateral hypertrophic facet joint changes seen. Moderate to severe narrowing of both neural foramen    C7-T1: Small bilateral nerve root sleeve cysts are seen.    The spinal cord demonstrates normal signal and caliber.      No abnormal areas of enhancement is seen.    IMPRESSION: Degenerative changes as described above.

## 2021-03-09 NOTE — PROGRESS NOTE ADULT - PROBLEM SELECTOR PROBLEM 5
Problem: Pain:  Goal: Pain level will decrease  Description: Pain level will decrease  10/19/2020 2053 by Joanie Hi RN  Outcome: Ongoing  Note: Pain managed with meds and repositioning   10/19/2020 1705 by Vitor Mandel RN  Outcome: Ongoing  Goal: Control of acute pain  Description: Control of acute pain  10/19/2020 1705 by Vitor Mandel RN  Outcome: Ongoing  Goal: Control of chronic pain  Description: Control of chronic pain  10/19/2020 1705 by Vitor Mandel RN  Outcome: Ongoing Macrocytic anemia

## 2021-03-09 NOTE — PROGRESS NOTE ADULT - PROBLEM SELECTOR PLAN 8
Diet: DASH/TLC  Dispo: PT eval: rehab facility  DVT ppx: lovenox Diet: DASH/TLC  Dispo: PT eval: rehab facility - accepted to [facility]  DVT ppx: lovenox Diet: DASH/TLC  Dispo: PT eval: rehab facility - accepted to [facility], but cancelling discharge due to MRI findings.  DVT ppx: lovenox

## 2021-03-09 NOTE — PROGRESS NOTE ADULT - SUBJECTIVE AND OBJECTIVE BOX
***************************************************************  Nette Yeni, PGY1  Internal Medicine   pager: NS: [phone] [facility]: [phone]  ***************************************************************    PROGRESS NOTE:     Patient is a 65y old  Female who presents with a chief complaint of Bilateral LE weakness (proximal > distal) (08 Mar 2021 05:56)      SUBJECTIVE / OVERNIGHT EVENTS:      MEDICATIONS  (STANDING):  cyanocobalamin 1000 MICROGram(s) Oral daily  enoxaparin Injectable 40 milliGRAM(s) SubCutaneous daily  FLUoxetine 20 milliGRAM(s) Oral daily  folic acid 1 milliGRAM(s) Oral daily  influenza   Vaccine 0.5 milliLiter(s) IntraMuscular once  melatonin 6 milliGRAM(s) Oral at bedtime  multivitamin 1 Tablet(s) Oral daily  polyethylene glycol 3350 17 Gram(s) Oral daily  senna 2 Tablet(s) Oral at bedtime  thiamine 100 milliGRAM(s) Oral daily    MEDICATIONS  (PRN):      CAPILLARY BLOOD GLUCOSE        I&O's Summary      PHYSICAL EXAM:  Vital Signs Last 24 Hrs  T(C): 36.6 (09 Mar 2021 05:41), Max: 36.6 (08 Mar 2021 14:35)  T(F): 97.8 (09 Mar 2021 05:41), Max: 97.8 (08 Mar 2021 14:35)  HR: 97 (09 Mar 2021 05:41) (93 - 98)  BP: 128/88 (09 Mar 2021 05:41) (128/88 - 138/80)  BP(mean): --  RR: 18 (09 Mar 2021 05:41) (16 - 19)  SpO2: 98% (09 Mar 2021 05:41) (95% - 98%)    PHYSICAL EXAM:  GENERAL: NAD, well-groomed, well-developed  HEAD:  Atraumatic, Normocephalic  EYES: (+) xanthelasma. EOMI, PERRLA, conjunctiva and sclera clear.  ENT: No tonsillar erythema, exudates, or enlargement; Moist mucous membranes, Good dentition, No lesions  NECK: Supple, No JVD, Normal thyroid  NERVOUS SYSTEM:  Alert & Oriented X3, Good concentration; Motor Strength 3-4/5 B/L upper prox, 5/5 wrist and hands. Lower extremities hip flexors 5/5, knee flexors 5/5, ankle flexors 5/5.  Sensation decreased R LE distally>proximally  CHEST/LUNG: Clear to percussion bilaterally; No rales, rhonchi, wheezing, or rubs  HEART: Regular rate and rhythm; No murmurs, rubs, or gallops  ABDOMEN: Soft, Nontender, Nondistended; Bowel sounds present  EXTREMITIES:  2+ Peripheral Pulses, No clubbing, cyanosis, or edema  LYMPH: No lymphadenopathy noted  SKIN: No rashes or     LABS:                        11.4   7.51  )-----------( 374      ( 08 Mar 2021 07:14 )             34.4     03-08    138  |  106  |  7   ----------------------------<  80  4.2   |  23  |  0.50    Ca    8.7      08 Mar 2021 07:14  Phos  4.0     03-08  Mg     2.0     03-08    TPro  5.4<L>  /  Alb  2.3<L>  /  TBili  0.3  /  DBili  x   /  AST  16  /  ALT  5   /  AlkPhos  76  03-08                RADIOLOGY & ADDITIONAL TESTS:  Results Reviewed:   Imaging Personally Reviewed:  Electrocardiogram Personally Reviewed:    COORDINATION OF CARE:  Care Discussed with Consultants/Other Providers [Y/N]:  Prior or Outpatient Records Reviewed [Y/N]:   ***************************************************************  Netteseth Jaime, PGY1  Internal Medicine   pager: NS: [phone] [facility]: [phone]  ***************************************************************    PROGRESS NOTE:     Patient is a 65y old  Female who presents with a chief complaint of Bilateral LE weakness (proximal > distal) (08 Mar 2021 05:56)      SUBJECTIVE / OVERNIGHT EVENTS:  No acute events overnight. Patient has slightly improved strength from yesterday. MRI spine showed degenerative changes w/ multiple bulging discs.    MEDICATIONS  (STANDING):  cyanocobalamin 1000 MICROGram(s) Oral daily  enoxaparin Injectable 40 milliGRAM(s) SubCutaneous daily  FLUoxetine 20 milliGRAM(s) Oral daily  folic acid 1 milliGRAM(s) Oral daily  influenza   Vaccine 0.5 milliLiter(s) IntraMuscular once  melatonin 6 milliGRAM(s) Oral at bedtime  multivitamin 1 Tablet(s) Oral daily  polyethylene glycol 3350 17 Gram(s) Oral daily  senna 2 Tablet(s) Oral at bedtime  thiamine 100 milliGRAM(s) Oral daily    MEDICATIONS  (PRN):      CAPILLARY BLOOD GLUCOSE        I&O's Summary      PHYSICAL EXAM:  Vital Signs Last 24 Hrs  T(C): 36.6 (09 Mar 2021 05:41), Max: 36.6 (08 Mar 2021 14:35)  T(F): 97.8 (09 Mar 2021 05:41), Max: 97.8 (08 Mar 2021 14:35)  HR: 97 (09 Mar 2021 05:41) (93 - 98)  BP: 128/88 (09 Mar 2021 05:41) (128/88 - 138/80)  BP(mean): --  RR: 18 (09 Mar 2021 05:41) (16 - 19)  SpO2: 98% (09 Mar 2021 05:41) (95% - 98%)    PHYSICAL EXAM:  GENERAL: NAD, well-groomed, well-developed  HEAD:  Atraumatic, Normocephalic  EYES: (+) xanthelasma. EOMI, PERRLA, conjunctiva and sclera clear.  ENT: No tonsillar erythema, exudates, or enlargement; Moist mucous membranes, Good dentition, No lesions  NECK: Supple, No JVD, Normal thyroid  NERVOUS SYSTEM:  Alert & Oriented X3, Good concentration; Motor Strength 3-4/5 B/L upper prox, 5/5 wrist and hands. Lower extremities hip flexors 5/5, knee flexors 5/5, ankle flexors 5/5.  Sensation decreased R LE distally>proximally  CHEST/LUNG: Clear to percussion bilaterally; No rales, rhonchi, wheezing, or rubs  HEART: Regular rate and rhythm; No murmurs, rubs, or gallops  ABDOMEN: Soft, Nontender, Nondistended; Bowel sounds present  EXTREMITIES:  2+ Peripheral Pulses, No clubbing, cyanosis, or edema  LYMPH: No lymphadenopathy noted  SKIN: No rashes or     LABS:                        11.4   7.51  )-----------( 374      ( 08 Mar 2021 07:14 )             34.4     03-08    138  |  106  |  7   ----------------------------<  80  4.2   |  23  |  0.50    Ca    8.7      08 Mar 2021 07:14  Phos  4.0     03-08  Mg     2.0     03-08    TPro  5.4<L>  /  Alb  2.3<L>  /  TBili  0.3  /  DBili  x   /  AST  16  /  ALT  5   /  AlkPhos  76  03-08                RADIOLOGY & ADDITIONAL TESTS:  Results Reviewed:   Imaging Personally Reviewed:  Electrocardiogram Personally Reviewed:    COORDINATION OF CARE:  Care Discussed with Consultants/Other Providers [Y/N]:  Prior or Outpatient Records Reviewed [Y/N]:   ***************************************************************  Netteseth Jaime, PGY1  Internal Medicine   pager: NS: [phone] [facility]: [phone]  ***************************************************************    PROGRESS NOTE:     Patient is a 65y old  Female who presents with a chief complaint of Bilateral LE weakness (proximal > distal) (08 Mar 2021 05:56)      SUBJECTIVE / OVERNIGHT EVENTS:  No acute events overnight. Patient has slightly improved strength from yesterday. MRI spine showed degenerative changes w/ multiple bulging discs.    MEDICATIONS  (STANDING):  cyanocobalamin 1000 MICROGram(s) Oral daily  enoxaparin Injectable 40 milliGRAM(s) SubCutaneous daily  FLUoxetine 20 milliGRAM(s) Oral daily  folic acid 1 milliGRAM(s) Oral daily  influenza   Vaccine 0.5 milliLiter(s) IntraMuscular once  melatonin 6 milliGRAM(s) Oral at bedtime  multivitamin 1 Tablet(s) Oral daily  polyethylene glycol 3350 17 Gram(s) Oral daily  senna 2 Tablet(s) Oral at bedtime  thiamine 100 milliGRAM(s) Oral daily    MEDICATIONS  (PRN):      CAPILLARY BLOOD GLUCOSE        I&O's Summary      PHYSICAL EXAM:  Vital Signs Last 24 Hrs  T(C): 36.6 (09 Mar 2021 05:41), Max: 36.6 (08 Mar 2021 14:35)  T(F): 97.8 (09 Mar 2021 05:41), Max: 97.8 (08 Mar 2021 14:35)  HR: 97 (09 Mar 2021 05:41) (93 - 98)  BP: 128/88 (09 Mar 2021 05:41) (128/88 - 138/80)  BP(mean): --  RR: 18 (09 Mar 2021 05:41) (16 - 19)  SpO2: 98% (09 Mar 2021 05:41) (95% - 98%)    PHYSICAL EXAM:  GENERAL: NAD, well-groomed, well-developed  HEAD:  Atraumatic, Normocephalic  EYES: (+) xanthelasma. EOMI, PERRLA, conjunctiva and sclera clear.  ENT: No tonsillar erythema, exudates, or enlargement; Moist mucous membranes, Good dentition, No lesions  NECK: Supple, No JVD, Normal thyroid  NERVOUS SYSTEM:  Alert & Oriented X3, Good concentration; Motor Strength 3-4/5 B/L upper prox, 5/5 wrist and hands. Lower extremities hip flexors 5/5, knee flexors 5/5, ankle flexors 5/5.  Sensation decreased R LE distally>proximally  CHEST/LUNG: Clear to percussion bilaterally; No rales, rhonchi, wheezing, or rubs  HEART: Regular rate and rhythm; No murmurs, rubs, or gallops  ABDOMEN: Soft, Nontender, Nondistended; Bowel sounds present  EXTREMITIES:  2+ Peripheral Pulses, No clubbing, cyanosis, or edema  LYMPH: No lymphadenopathy noted  SKIN: No rashes or     LABS:                        11.4   7.51  )-----------( 374      ( 08 Mar 2021 07:14 )             34.4     03-08    138  |  106  |  7   ----------------------------<  80  4.2   |  23  |  0.50    Ca    8.7      08 Mar 2021 07:14  Phos  4.0     03-08  Mg     2.0     03-08    TPro  5.4<L>  /  Alb  2.3<L>  /  TBili  0.3  /  DBili  x   /  AST  16  /  ALT  5   /  AlkPhos  76  03-08                RADIOLOGY & ADDITIONAL TESTS:  Results Reviewed:   Imaging Personally Reviewed:  Electrocardiogram Personally Reviewed:    COORDINATION OF CARE:  Care Discussed with Consultants/Other Providers [Y/N]: MAKSIM Rowe (neuro), concern for metastatic disease/demyelinating disease  Prior or Outpatient Records Reviewed [Y/N]:

## 2021-03-09 NOTE — DISCHARGE NOTE NURSING/CASE MANAGEMENT/SOCIAL WORK - PATIENT PORTAL LINK FT
You can access the FollowMyHealth Patient Portal offered by [facility] by registering at the following website: [email or web address]. By joining [facility] [employer] [facility]’s FollowMyHealth portal, you will also be able to view your health information using other applications (apps) compatible with our system.

## 2021-03-09 NOTE — PROGRESS NOTE ADULT - ATTENDING COMMENTS
Patient seen and examined at bedside with neurology team and above note reviewed and I agree with assessment and plan as outlined. Patient repots feeling better and states her walking and balance is improving slowly each day.  She had her Mri brain which I reviewed in detailed with neuroradiology and she has some regions of increased T2 signal and 2 areas of enhancement which are concerning for either inflammation, demyelination vs less likely metastatic disease     Plan: 1) Proceed with CT of chest, abdomen and pelvis to asses for abnormal masses     2. Paraneoplastic and autoimmune encephalopathy panel from serum and CSF    3. Spinal tap to assess for cell ct, glucose, protein and OCB, myelin basic protein and IgG synthesis     4. Follow up after the above testing is completed and all questions answered and patient understood and is willing to comply.    5. Continue medical mgt and supportive care and PT
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
## 2021-03-09 NOTE — PROGRESS NOTE ADULT - ASSESSMENT
65F w/ PMH of etoh use disorder, depression presents w/ progressive weakness for 2 months.     Impression: Progressive proximal pattern weakness w/ decreased vibration sensation. MRI w/ T2 prolongation b/l. Etiology uncertain, possibilities include subacute combined degeneration (i.e B12 deficiency), v, demyelination.     Plan:   [] CT C/A/P  [] LP w/ basic studies, myelin basic protein, oligoclonal bands, IgG Index, CSF paraneoplastic panel, CSF encephalitis panel.   [] Serum Paraneoplastic panel.   [x] Continue Thiamine treatment   [] PT/OT  65F w/ PMH of etoh use disorder, depression presents w/ progressive weakness for 2 months.     Impression: Progressive proximal pattern weakness w/ decreased vibration sensation. MRI w/ T2 prolongation b/l. Etiology uncertain, possibilities include subacute combined degeneration (i.e B12 deficiency), v, demyelination vs tiny mets but less likely      Plan:     [] CT C/A/P  [] LP w/ basic studies, myelin basic protein, oligoclonal bands, IgG Index, CSF paraneoplastic panel, CSF autoimmune encephalitis panel.   [] Serum Paraneoplastic panel.   [x] Continue Thiamine treatment   [] PT/OT

## 2021-03-10 LAB
ANION GAP SERPL CALC-SCNC: 11 MMOL/L — SIGNIFICANT CHANGE UP (ref 7–14)
APPEARANCE CSF: CLEAR — SIGNIFICANT CHANGE UP
APPEARANCE SPUN FLD: COLORLESS — SIGNIFICANT CHANGE UP
BASOPHILS # BLD AUTO: 0.03 K/UL — SIGNIFICANT CHANGE UP (ref 0–0.2)
BASOPHILS NFR BLD AUTO: 0.4 % — SIGNIFICANT CHANGE UP (ref 0–2)
BUN SERPL-MCNC: 12 MG/DL — SIGNIFICANT CHANGE UP (ref 7–23)
CALCIUM SERPL-MCNC: 8.7 MG/DL — SIGNIFICANT CHANGE UP (ref 8.4–10.5)
CHLORIDE SERPL-SCNC: 106 MMOL/L — SIGNIFICANT CHANGE UP (ref 98–107)
CO2 SERPL-SCNC: 23 MMOL/L — SIGNIFICANT CHANGE UP (ref 22–31)
COLOR CSF: COLORLESS — SIGNIFICANT CHANGE UP
CREAT SERPL-MCNC: 0.55 MG/DL — SIGNIFICANT CHANGE UP (ref 0.5–1.3)
CRYPTOC AG CSF-ACNC: NEGATIVE — SIGNIFICANT CHANGE UP
CSF PCR RESULT: SIGNIFICANT CHANGE UP
EOSINOPHIL # BLD AUTO: 0.38 K/UL — SIGNIFICANT CHANGE UP (ref 0–0.5)
EOSINOPHIL NFR BLD AUTO: 5 % — SIGNIFICANT CHANGE UP (ref 0–6)
GLUCOSE CSF-MCNC: 50 MG/DL — SIGNIFICANT CHANGE UP (ref 40–70)
GLUCOSE SERPL-MCNC: 75 MG/DL — SIGNIFICANT CHANGE UP (ref 70–99)
GRAM STN FLD: SIGNIFICANT CHANGE UP
HCT VFR BLD CALC: 32.3 % — LOW (ref 34.5–45)
HGB BLD-MCNC: 10.5 G/DL — LOW (ref 11.5–15.5)
IANC: 4.4 K/UL — SIGNIFICANT CHANGE UP (ref 1.5–8.5)
IMM GRANULOCYTES NFR BLD AUTO: 0.4 % — SIGNIFICANT CHANGE UP (ref 0–1.5)
INR BLD: 1.09 RATIO — SIGNIFICANT CHANGE UP (ref 0.88–1.16)
LABORATORY COMMENT REPORT: SIGNIFICANT CHANGE UP
LYMPHOCYTES # BLD AUTO: 2.12 K/UL — SIGNIFICANT CHANGE UP (ref 1–3.3)
LYMPHOCYTES # BLD AUTO: 28.1 % — SIGNIFICANT CHANGE UP (ref 13–44)
LYMPHOCYTES # CSF: 48 % — SIGNIFICANT CHANGE UP
MAGNESIUM SERPL-MCNC: 1.9 MG/DL — SIGNIFICANT CHANGE UP (ref 1.6–2.6)
MCHC RBC-ENTMCNC: 32.5 GM/DL — SIGNIFICANT CHANGE UP (ref 32–36)
MCHC RBC-ENTMCNC: 34.7 PG — HIGH (ref 27–34)
MCV RBC AUTO: 106.6 FL — HIGH (ref 80–100)
MONOCYTES # BLD AUTO: 0.58 K/UL — SIGNIFICANT CHANGE UP (ref 0–0.9)
MONOCYTES NFR BLD AUTO: 7.7 % — SIGNIFICANT CHANGE UP (ref 2–14)
MONOS+MACROS NFR CSF: 52 % — SIGNIFICANT CHANGE UP
NEUTROPHILS # BLD AUTO: 4.4 K/UL — SIGNIFICANT CHANGE UP (ref 1.8–7.4)
NEUTROPHILS # CSF: 0 % — SIGNIFICANT CHANGE UP
NEUTROPHILS NFR BLD AUTO: 58.4 % — SIGNIFICANT CHANGE UP (ref 43–77)
NIGHT BLUE STAIN TISS: SIGNIFICANT CHANGE UP
NRBC # BLD: 0 /100 WBCS — SIGNIFICANT CHANGE UP
NRBC # FLD: 0 K/UL — SIGNIFICANT CHANGE UP
NRBC NFR CSF: 1 CELLS/UL — SIGNIFICANT CHANGE UP (ref 0–5)
PHOSPHATE SERPL-MCNC: 3.9 MG/DL — SIGNIFICANT CHANGE UP (ref 2.5–4.5)
PLATELET # BLD AUTO: 378 K/UL — SIGNIFICANT CHANGE UP (ref 150–400)
POTASSIUM SERPL-MCNC: 3.6 MMOL/L — SIGNIFICANT CHANGE UP (ref 3.5–5.3)
POTASSIUM SERPL-SCNC: 3.6 MMOL/L — SIGNIFICANT CHANGE UP (ref 3.5–5.3)
PROT CSF-MCNC: 25 MG/DL — SIGNIFICANT CHANGE UP (ref 15–45)
PROTHROM AB SERPL-ACNC: 12.5 SEC — SIGNIFICANT CHANGE UP (ref 10.6–13.6)
RBC # BLD: 3.03 M/UL — LOW (ref 3.8–5.2)
RBC # CSF: <1 CELLS/UL — SIGNIFICANT CHANGE UP (ref 0–0)
RBC # FLD: 16.5 % — HIGH (ref 10.3–14.5)
SODIUM SERPL-SCNC: 140 MMOL/L — SIGNIFICANT CHANGE UP (ref 135–145)
SOURCE HSV 1/2: SIGNIFICANT CHANGE UP
SPECIMEN SOURCE: SIGNIFICANT CHANGE UP
SPECIMEN SOURCE: SIGNIFICANT CHANGE UP
TOTAL CELLS COUNTED, SPINAL FLUID: 23 CELLS — SIGNIFICANT CHANGE UP
TUBE TYPE: SIGNIFICANT CHANGE UP
WBC # BLD: 7.54 K/UL — SIGNIFICANT CHANGE UP (ref 3.8–10.5)
WBC # FLD AUTO: 7.54 K/UL — SIGNIFICANT CHANGE UP (ref 3.8–10.5)
ZINC SERPL-MCNC: 74 UG/DL — SIGNIFICANT CHANGE UP (ref 44–115)

## 2021-03-10 PROCEDURE — 99232 SBSQ HOSP IP/OBS MODERATE 35: CPT | Mod: GC,25

## 2021-03-10 PROCEDURE — 62270 DX LMBR SPI PNXR: CPT | Mod: GC,59

## 2021-03-10 RX ORDER — MAGNESIUM SULFATE 500 MG/ML
2 VIAL (ML) INJECTION ONCE
Refills: 0 | Status: COMPLETED | OUTPATIENT
Start: 2021-03-10 | End: 2021-03-10

## 2021-03-10 RX ORDER — ENOXAPARIN SODIUM 100 MG/ML
40 INJECTION SUBCUTANEOUS DAILY
Refills: 0 | Status: DISCONTINUED | OUTPATIENT
Start: 2021-03-10 | End: 2021-03-12

## 2021-03-10 RX ADMIN — Medication 100 MILLIGRAM(S): at 12:15

## 2021-03-10 RX ADMIN — Medication 50 GRAM(S): at 10:12

## 2021-03-10 RX ADMIN — ENOXAPARIN SODIUM 40 MILLIGRAM(S): 100 INJECTION SUBCUTANEOUS at 12:16

## 2021-03-10 RX ADMIN — Medication 1 MILLIGRAM(S): at 12:16

## 2021-03-10 RX ADMIN — POLYETHYLENE GLYCOL 3350 17 GRAM(S): 17 POWDER, FOR SOLUTION ORAL at 12:15

## 2021-03-10 RX ADMIN — Medication 20 MILLIGRAM(S): at 12:15

## 2021-03-10 RX ADMIN — Medication 1 TABLET(S): at 12:17

## 2021-03-10 RX ADMIN — Medication 6 MILLIGRAM(S): at 21:39

## 2021-03-10 RX ADMIN — PREGABALIN 1000 MICROGRAM(S): 225 CAPSULE ORAL at 12:15
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
## 2021-03-10 NOTE — PROGRESS NOTE ADULT - PROBLEM SELECTOR PLAN 8
Diet: DASH/TLC  Dispo: PT eval: rehab facility - accepted to [facility], but cancelling discharge due to MRI findings.  DVT ppx: lovenox Diet: DASH/TLC  Dispo: PT eval: rehab facility - accepted to [facility], but cancelled discharge due to MRI findings.  DVT ppx: lovenox

## 2021-03-10 NOTE — PROGRESS NOTE ADULT - PROBLEM SELECTOR PLAN 1
Bilateral LE weakness started 2 months ago, prox > distal, equal bilaterally, no numbness or tingling and no imbalance. Also, UE weakness prox > distal.  - B12 low/normal, CK and TSH normal, CRP (40) and ESR (77) elevated, high homocysteine (81.2), negative Silica clotting and Joel's Viper Venom  - s/p thiamine 500 mg TID x 3 days   - c/w thiamine 100 mg daily, multivitamin, folate and cyanocobalamin 1000 mcg daily  - PT consult  - neuro consulted: apprec recs  - MRI head and C-spine c/f MS vs metastatic disease (3/9)  - ordered CT a/p and LP  - negative autoimmune labs so far  - pending Myasthenia gravis labs, vitamin/mineral def labs Bilateral LE weakness started 2 months ago, prox > distal, equal bilaterally, no numbness or tingling and no imbalance. Also, UE weakness prox > distal.  - B12 low/normal, CK and TSH normal, CRP (40) and ESR (77) elevated, high homocysteine (81.2), negative Silica clotting and Joel's Viper Venom  - s/p thiamine 500 mg TID x 3 days   - c/w thiamine 100 mg daily, multivitamin, folate and cyanocobalamin 1000 mcg daily  - neuro consulted: apprec recs  - MRI head and C-spine c/f MS vs metastatic disease (3/9)  - CT c/a/p (3/9) negative for malignancy   - LP (3/10): f/u studies  - negative autoimmune labs so far  - pending Myasthenia gravis labs, vitamin/mineral def labs

## 2021-03-10 NOTE — PROCEDURE NOTE - ADDITIONAL PROCEDURE DETAILS
Invasive procedure team was consulted for diagnostic/therapeutic LP.  Patient Platelets were 378 and PT/INR 1.09 and MRI head showed white matter enhancement. Patient not on DVT PPx. Patient with no overlying skin infection. Patient was explained risks (including bleeding, infection, and risk of paralysis) and benefits and consented (in Chart). Procedure was done using sterile technique and equipment. Patient was in sitting position due to difficulty obtaining landmarks. Skin was cleaned with iodine. Opening pressure was not obtained. 14 mL of clear CSF obtained. Stylet re-inserted and needle removed and dressed. Pt tolerated procedure well and no complications. Lab Analysis given to primary team.

## 2021-03-10 NOTE — PROGRESS NOTE ADULT - SUBJECTIVE AND OBJECTIVE BOX
***************************************************************  Nette Jaime, PGY1  Internal Medicine   pager: NS: [phone] [facility]: [phone]  ***************************************************************    PROGRESS NOTE:     Patient is a 65y old  Female who presents with a chief complaint of Bilateral LE weakness (proximal > distal) (09 Mar 2021 11:53)      SUBJECTIVE / OVERNIGHT EVENTS:      MEDICATIONS  (STANDING):  cyanocobalamin 1000 MICROGram(s) Oral daily  FLUoxetine 20 milliGRAM(s) Oral daily  folic acid 1 milliGRAM(s) Oral daily  influenza   Vaccine 0.5 milliLiter(s) IntraMuscular once  melatonin 6 milliGRAM(s) Oral at bedtime  multivitamin 1 Tablet(s) Oral daily  polyethylene glycol 3350 17 Gram(s) Oral daily  senna 2 Tablet(s) Oral at bedtime  thiamine 100 milliGRAM(s) Oral daily    MEDICATIONS  (PRN):      CAPILLARY BLOOD GLUCOSE        I&O's Summary    08 Mar 2021 07:01  -  09 Mar 2021 07:00  --------------------------------------------------------  IN: 200 mL / OUT: 0 mL / NET: 200 mL        PHYSICAL EXAM:  Vital Signs Last 24 Hrs  T(C): 36.8 (09 Mar 2021 21:17), Max: 37 (09 Mar 2021 15:03)  T(F): 98.3 (09 Mar 2021 21:17), Max: 98.6 (09 Mar 2021 15:03)  HR: 96 (09 Mar 2021 21:17) (93 - 96)  BP: 123/73 (09 Mar 2021 21:17) (123/73 - 133/81)  BP(mean): --  RR: 16 (09 Mar 2021 21:17) (16 - 18)  SpO2: 96% (09 Mar 2021 21:17) (96% - 98%)    PHYSICAL EXAM:  GENERAL: NAD, well-groomed, well-developed  HEAD:  Atraumatic, Normocephalic  EYES: (+) xanthelasma. EOMI, PERRLA, conjunctiva and sclera clear.  ENT: No tonsillar erythema, exudates, or enlargement; Moist mucous membranes, Good dentition, No lesions  NECK: Supple, No JVD, Normal thyroid  NERVOUS SYSTEM:  Alert & Oriented X3, Good concentration; Motor Strength 3-4/5 B/L upper prox, 5/5 wrist and hands. Lower extremities hip flexors 5/5, knee flexors 5/5, ankle flexors 5/5.  Sensation decreased R LE distally>proximally  CHEST/LUNG: Clear to percussion bilaterally; No rales, rhonchi, wheezing, or rubs  HEART: Regular rate and rhythm; No murmurs, rubs, or gallops  ABDOMEN: Soft, Nontender, Nondistended; Bowel sounds present  EXTREMITIES:  2+ Peripheral Pulses, No clubbing, cyanosis, or edema  LYMPH: No lymphadenopathy noted  SKIN: No rashes     LABS:                        11.3   6.98  )-----------( 370      ( 09 Mar 2021 06:53 )             33.5     03-09    139  |  106  |  11  ----------------------------<  84  3.8   |  23  |  0.54    Ca    8.9      09 Mar 2021 06:53  Phos  4.0     03-09  Mg     2.0     03-09    TPro  5.3<L>  /  Alb  2.3<L>  /  TBili  0.2  /  DBili  x   /  AST  14  /  ALT  5   /  AlkPhos  73  03-09                RADIOLOGY & ADDITIONAL TESTS:  Results Reviewed:   Imaging Personally Reviewed:  Electrocardiogram Personally Reviewed:    COORDINATION OF CARE:  Care Discussed with Consultants/Other Providers [Y/N]:  Prior or Outpatient Records Reviewed [Y/N]:   ***************************************************************  Nette Jaime, PGY1  Internal Medicine   pager: NS: [phone] [facility]: [phone]  ***************************************************************    PROGRESS NOTE:     Patient is a 65y old  Female who presents with a chief complaint of Bilateral LE weakness (proximal > distal) (09 Mar 2021 11:53)      SUBJECTIVE / OVERNIGHT EVENTS:  No acute events overnight. Patient walked up and down the finley yesterday with a walker, improved strength. Hip flexors 5/5 strength and shoulder flexors 4/5 strength. She had a CT c/a/p and received ativan prior, pending read. She is going for an LP today to determine whether she has MS.    MEDICATIONS  (STANDING):  cyanocobalamin 1000 MICROGram(s) Oral daily  FLUoxetine 20 milliGRAM(s) Oral daily  folic acid 1 milliGRAM(s) Oral daily  influenza   Vaccine 0.5 milliLiter(s) IntraMuscular once  melatonin 6 milliGRAM(s) Oral at bedtime  multivitamin 1 Tablet(s) Oral daily  polyethylene glycol 3350 17 Gram(s) Oral daily  senna 2 Tablet(s) Oral at bedtime  thiamine 100 milliGRAM(s) Oral daily    MEDICATIONS  (PRN):      CAPILLARY BLOOD GLUCOSE        I&O's Summary    08 Mar 2021 07:01  -  09 Mar 2021 07:00  --------------------------------------------------------  IN: 200 mL / OUT: 0 mL / NET: 200 mL        PHYSICAL EXAM:  Vital Signs Last 24 Hrs  T(C): 36.8 (09 Mar 2021 21:17), Max: 37 (09 Mar 2021 15:03)  T(F): 98.3 (09 Mar 2021 21:17), Max: 98.6 (09 Mar 2021 15:03)  HR: 96 (09 Mar 2021 21:17) (93 - 96)  BP: 123/73 (09 Mar 2021 21:17) (123/73 - 133/81)  BP(mean): --  RR: 16 (09 Mar 2021 21:17) (16 - 18)  SpO2: 96% (09 Mar 2021 21:17) (96% - 98%)    PHYSICAL EXAM:  GENERAL: NAD, well-groomed, well-developed  HEAD:  Atraumatic, Normocephalic  EYES: (+) xanthelasma. EOMI, PERRLA, conjunctiva and sclera clear.  ENT: No tonsillar erythema, exudates, or enlargement; Moist mucous membranes, Good dentition, No lesions  NECK: Supple, No JVD, Normal thyroid  NERVOUS SYSTEM:  Alert & Oriented X3, Good concentration; Motor Strength 4/5 B/L upper prox, 5/5 wrist and hands. Lower extremities hip flexors 5/5, knee flexors 5/5, ankle flexors 5/5.  Sensation decreased R LE distally>proximally  CHEST/LUNG: Clear to percussion bilaterally; No rales, rhonchi, wheezing, or rubs  HEART: Regular rate and rhythm; No murmurs, rubs, or gallops  ABDOMEN: Soft, Nontender, Nondistended; Bowel sounds present  EXTREMITIES:  2+ Peripheral Pulses, No clubbing, cyanosis, or edema  LYMPH: No lymphadenopathy noted  SKIN: No rashes     LABS:                        11.3   6.98  )-----------( 370      ( 09 Mar 2021 06:53 )             33.5     03-09    139  |  106  |  11  ----------------------------<  84  3.8   |  23  |  0.54    Ca    8.9      09 Mar 2021 06:53  Phos  4.0     03-09  Mg     2.0     03-09    TPro  5.3<L>  /  Alb  2.3<L>  /  TBili  0.2  /  DBili  x   /  AST  14  /  ALT  5   /  AlkPhos  73  03-09                RADIOLOGY & ADDITIONAL TESTS:  Results Reviewed:   Imaging Personally Reviewed:  Electrocardiogram Personally Reviewed:    COORDINATION OF CARE:  Care Discussed with Consultants/Other Providers [Y/N]:  Prior or Outpatient Records Reviewed [Y/N]:   ***************************************************************  Nette Jaime, PGY1  Internal Medicine   pager: NS: [phone] [facility]: [phone]  ***************************************************************    PROGRESS NOTE:     Patient is a 65y old  Female who presents with a chief complaint of Bilateral LE weakness (proximal > distal) (09 Mar 2021 11:53)      SUBJECTIVE / OVERNIGHT EVENTS:  No acute events overnight. Patient walked up and down the finley yesterday with a walker, improved strength. Hip flexors 5/5 strength and shoulder flexors 4/5 strength. She had a CT c/a/p and received ativan prior, pending read. She is going for an LP today to determine whether she has MS.    MEDICATIONS  (STANDING):  cyanocobalamin 1000 MICROGram(s) Oral daily  FLUoxetine 20 milliGRAM(s) Oral daily  folic acid 1 milliGRAM(s) Oral daily  influenza   Vaccine 0.5 milliLiter(s) IntraMuscular once  melatonin 6 milliGRAM(s) Oral at bedtime  multivitamin 1 Tablet(s) Oral daily  polyethylene glycol 3350 17 Gram(s) Oral daily  senna 2 Tablet(s) Oral at bedtime  thiamine 100 milliGRAM(s) Oral daily    MEDICATIONS  (PRN):      CAPILLARY BLOOD GLUCOSE        I&O's Summary    08 Mar 2021 07:01  -  09 Mar 2021 07:00  --------------------------------------------------------  IN: 200 mL / OUT: 0 mL / NET: 200 mL        PHYSICAL EXAM:  Vital Signs Last 24 Hrs  T(C): 36.8 (09 Mar 2021 21:17), Max: 37 (09 Mar 2021 15:03)  T(F): 98.3 (09 Mar 2021 21:17), Max: 98.6 (09 Mar 2021 15:03)  HR: 96 (09 Mar 2021 21:17) (93 - 96)  BP: 123/73 (09 Mar 2021 21:17) (123/73 - 133/81)  BP(mean): --  RR: 16 (09 Mar 2021 21:17) (16 - 18)  SpO2: 96% (09 Mar 2021 21:17) (96% - 98%)    PHYSICAL EXAM:  GENERAL: NAD, well-groomed, well-developed  HEAD:  Atraumatic, Normocephalic  EYES: (+) xanthelasma. EOMI, PERRLA, conjunctiva and sclera clear.  ENT: No tonsillar erythema, exudates, or enlargement; Moist mucous membranes, Good dentition, No lesions  NECK: Supple, No JVD, Normal thyroid  NERVOUS SYSTEM:  Alert & Oriented X3, Good concentration; Motor Strength 4/5 B/L upper prox, 5/5 wrist and hands. Lower extremities hip flexors 5/5, knee flexors 5/5, ankle flexors 5/5.  Sensation decreased R LE distally>proximally  CHEST/LUNG: Clear to percussion bilaterally; No rales, rhonchi, wheezing, or rubs  HEART: Regular rate and rhythm; No murmurs, rubs, or gallops  ABDOMEN: Soft, Nontender, Nondistended; Bowel sounds present  EXTREMITIES:  2+ Peripheral Pulses, No clubbing, cyanosis, or edema  LYMPH: No lymphadenopathy noted  SKIN: No rashes     LABS:                        11.3   6.98  )-----------( 370      ( 09 Mar 2021 06:53 )             33.5     03-09    139  |  106  |  11  ----------------------------<  84  3.8   |  23  |  0.54    Ca    8.9      09 Mar 2021 06:53  Phos  4.0     03-09  Mg     2.0     03-09    TPro  5.3<L>  /  Alb  2.3<L>  /  TBili  0.2  /  DBili  x   /  AST  14  /  ALT  5   /  AlkPhos  73  03-09                RADIOLOGY & ADDITIONAL TESTS:  Results Reviewed:  CT C/A/P: small calcified granuloma. mild hilar LAD  Imaging Personally Reviewed:  Electrocardiogram Personally Reviewed:    COORDINATION OF CARE:  Care Discussed with Consultants/Other Providers [Y/N]:  Prior or Outpatient Records Reviewed [Y/N]:

## 2021-03-11 LAB
ALBUMIN SERPL ELPH-MCNC: 2.5 G/DL — LOW (ref 3.3–5)
ALP SERPL-CCNC: 63 U/L — SIGNIFICANT CHANGE UP (ref 40–120)
ALT FLD-CCNC: 6 U/L — SIGNIFICANT CHANGE UP (ref 4–33)
ANION GAP SERPL CALC-SCNC: 12 MMOL/L — SIGNIFICANT CHANGE UP (ref 7–14)
AST SERPL-CCNC: 17 U/L — SIGNIFICANT CHANGE UP (ref 4–32)
BASOPHILS # BLD AUTO: 0.05 K/UL — SIGNIFICANT CHANGE UP (ref 0–0.2)
BASOPHILS NFR BLD AUTO: 0.7 % — SIGNIFICANT CHANGE UP (ref 0–2)
BILIRUB SERPL-MCNC: 0.2 MG/DL — SIGNIFICANT CHANGE UP (ref 0.2–1.2)
BUN SERPL-MCNC: 14 MG/DL — SIGNIFICANT CHANGE UP (ref 7–23)
CALCIUM SERPL-MCNC: 8.6 MG/DL — SIGNIFICANT CHANGE UP (ref 8.4–10.5)
CHLORIDE SERPL-SCNC: 109 MMOL/L — HIGH (ref 98–107)
CO2 SERPL-SCNC: 21 MMOL/L — LOW (ref 22–31)
CREAT SERPL-MCNC: 0.52 MG/DL — SIGNIFICANT CHANGE UP (ref 0.5–1.3)
EOSINOPHIL # BLD AUTO: 0.37 K/UL — SIGNIFICANT CHANGE UP (ref 0–0.5)
EOSINOPHIL NFR BLD AUTO: 4.8 % — SIGNIFICANT CHANGE UP (ref 0–6)
GLUCOSE SERPL-MCNC: 79 MG/DL — SIGNIFICANT CHANGE UP (ref 70–99)
HCT VFR BLD CALC: 32.7 % — LOW (ref 34.5–45)
HGB BLD-MCNC: 10.8 G/DL — LOW (ref 11.5–15.5)
IANC: 4.28 K/UL — SIGNIFICANT CHANGE UP (ref 1.5–8.5)
IMM GRANULOCYTES NFR BLD AUTO: 0.5 % — SIGNIFICANT CHANGE UP (ref 0–1.5)
LDH CSF L TO P-CCNC: 24 U/L — SIGNIFICANT CHANGE UP
LDH FLD-CCNC: 24 U/L — SIGNIFICANT CHANGE UP
LYMPHOCYTES # BLD AUTO: 2.33 K/UL — SIGNIFICANT CHANGE UP (ref 1–3.3)
LYMPHOCYTES # BLD AUTO: 30.3 % — SIGNIFICANT CHANGE UP (ref 13–44)
MAGNESIUM SERPL-MCNC: 2 MG/DL — SIGNIFICANT CHANGE UP (ref 1.6–2.6)
MCHC RBC-ENTMCNC: 33 GM/DL — SIGNIFICANT CHANGE UP (ref 32–36)
MCHC RBC-ENTMCNC: 35 PG — HIGH (ref 27–34)
MCV RBC AUTO: 105.8 FL — HIGH (ref 80–100)
MONOCYTES # BLD AUTO: 0.61 K/UL — SIGNIFICANT CHANGE UP (ref 0–0.9)
MONOCYTES NFR BLD AUTO: 7.9 % — SIGNIFICANT CHANGE UP (ref 2–14)
NEUTROPHILS # BLD AUTO: 4.28 K/UL — SIGNIFICANT CHANGE UP (ref 1.8–7.4)
NEUTROPHILS NFR BLD AUTO: 55.8 % — SIGNIFICANT CHANGE UP (ref 43–77)
NRBC # BLD: 0 /100 WBCS — SIGNIFICANT CHANGE UP
NRBC # FLD: 0 K/UL — SIGNIFICANT CHANGE UP
PHOSPHATE SERPL-MCNC: 3.7 MG/DL — SIGNIFICANT CHANGE UP (ref 2.5–4.5)
PLATELET # BLD AUTO: 383 K/UL — SIGNIFICANT CHANGE UP (ref 150–400)
POTASSIUM SERPL-MCNC: 3.5 MMOL/L — SIGNIFICANT CHANGE UP (ref 3.5–5.3)
POTASSIUM SERPL-SCNC: 3.5 MMOL/L — SIGNIFICANT CHANGE UP (ref 3.5–5.3)
PROT SERPL-MCNC: 5.1 G/DL — LOW (ref 6–8.3)
RBC # BLD: 3.09 M/UL — LOW (ref 3.8–5.2)
RBC # FLD: 16.4 % — HIGH (ref 10.3–14.5)
SODIUM SERPL-SCNC: 142 MMOL/L — SIGNIFICANT CHANGE UP (ref 135–145)
WBC # BLD: 7.68 K/UL — SIGNIFICANT CHANGE UP (ref 3.8–10.5)
WBC # FLD AUTO: 7.68 K/UL — SIGNIFICANT CHANGE UP (ref 3.8–10.5)

## 2021-03-11 PROCEDURE — 99232 SBSQ HOSP IP/OBS MODERATE 35: CPT | Mod: GC

## 2021-03-11 RX ADMIN — Medication 100 MILLIGRAM(S): at 12:58

## 2021-03-11 RX ADMIN — Medication 1 TABLET(S): at 18:51

## 2021-03-11 RX ADMIN — Medication 20 MILLIGRAM(S): at 12:58

## 2021-03-11 RX ADMIN — PREGABALIN 1000 MICROGRAM(S): 225 CAPSULE ORAL at 12:58

## 2021-03-11 RX ADMIN — Medication 1 MILLIGRAM(S): at 12:58

## 2021-03-11 RX ADMIN — POLYETHYLENE GLYCOL 3350 17 GRAM(S): 17 POWDER, FOR SOLUTION ORAL at 12:59

## 2021-03-11 RX ADMIN — ENOXAPARIN SODIUM 40 MILLIGRAM(S): 100 INJECTION SUBCUTANEOUS at 12:58

## 2021-03-11 RX ADMIN — Medication 6 MILLIGRAM(S): at 21:37

## 2021-03-11 NOTE — DIETITIAN INITIAL EVALUATION ADULT. - PERTINENT LABORATORY DATA
03-11 Na142 mmol/L Glu 79 mg/dL K+ 3.5 mmol/L Cr  0.52 mg/dL BUN 14 mg/dL 03-11 Phos 3.7 mg/dL 03-11 Alb 2.5 g/dL<L> 03-04 Chol 135 mg/dL LDL --    HDL 58 mg/dL Trig 69 mg/dL

## 2021-03-11 NOTE — PROGRESS NOTE ADULT - ASSESSMENT
Assessment and plan:    Patient with bilateral leg weakness, peripheral neuropathic symptoms and abnormal White matter signal on MRI brain   To date neurologic workup has revealed B12 deficiency.  Gait improving with PT etc,,,    1. Remainder of workup all negative     2. The rest of CSF studies may be followed up as outpatient and no role for IV steroids at this juncture     3. Will need repeat MRI brain with contrast in 4-6 weeks to compare     4. All questions answered and patient understood plan and to follow up outpatient in 4-6 weeks at [address] [place] [address] with either myself, Dr. Ryenolds or Dr Guilherme Barksdale   [phone]

## 2021-03-11 NOTE — DIETITIAN INITIAL EVALUATION ADULT. - OTHER INFO
RD visited with patient for length of stay nutrition assessment.  Patient endorses a good appetite, completing all meals at this time.  Denies GI distress. No chewing or swallowing difficulties.  Shellfish allergy and strawberry allergy noted.  Patient stated she is unsure of her usual body weight but guesses it to be approximately 200 pounds.  Encouraged patient to continue to eat well balanced meals on her road to recovery.  Reviewed current diet, no education requested at this time.

## 2021-03-11 NOTE — DIETITIAN INITIAL EVALUATION ADULT. - SIGNS/SYMPTOMS
Lab values reflective of macrocytic anemia Lab values reflective of macrocytic anemia, B-12 and folate deficiency.

## 2021-03-11 NOTE — DIETITIAN INITIAL EVALUATION ADULT. - ETIOLOGY
Likely in setting of poor quality of diet PTA likely contributed by EtOH abuse, pending further w/u Likely in setting of poor quality of diet PTA likely contributed by EtOH abuse, pending further medical w/u

## 2021-03-11 NOTE — PROGRESS NOTE ADULT - NSHPATTENDINGPLANDISCUSS_GEN_ALL_CORE
patient and neurology and medical teams.
HS
patient, neurology and medical teams.
HS

## 2021-03-11 NOTE — DIETITIAN INITIAL EVALUATION ADULT. - ORAL INTAKE PTA/DIET HISTORY
Poor diet PTA - patient reported she was dealing with depression and drinking, leading to overall poor diet.

## 2021-03-11 NOTE — DIETITIAN INITIAL EVALUATION ADULT. - PERTINENT MEDS FT
MEDICATIONS  (STANDING):  cyanocobalamin 1000 MICROGram(s) Oral daily  enoxaparin Injectable 40 milliGRAM(s) SubCutaneous daily  FLUoxetine 20 milliGRAM(s) Oral daily  folic acid 1 milliGRAM(s) Oral daily  influenza   Vaccine 0.5 milliLiter(s) IntraMuscular once  melatonin 6 milliGRAM(s) Oral at bedtime  multivitamin 1 Tablet(s) Oral daily  polyethylene glycol 3350 17 Gram(s) Oral daily  senna 2 Tablet(s) Oral at bedtime  thiamine 100 milliGRAM(s) Oral daily    MEDICATIONS  (PRN):

## 2021-03-11 NOTE — PROGRESS NOTE ADULT - PROBLEM SELECTOR PLAN 1
Bilateral LE weakness started 2 months ago, prox > distal, equal bilaterally, no numbness or tingling and no imbalance. Also, UE weakness prox > distal.  - B12 low/normal, CK and TSH normal, CRP (40) and ESR (77) elevated, high homocysteine (81.2), negative Silica clotting and Joel's Viper Venom  - s/p thiamine 500 mg TID x 3 days   - c/w thiamine 100 mg daily, multivitamin, folate and cyanocobalamin 1000 mcg daily  - neuro consulted: apprec recs  - MRI head and C-spine c/f MS vs metastatic disease (3/9)  - CT c/a/p (3/9) negative for malignancy   - LP (3/10): f/u studies  - negative autoimmune labs so far  - pending Myasthenia gravis labs, vitamin/mineral def labs

## 2021-03-11 NOTE — PROGRESS NOTE ADULT - SUBJECTIVE AND OBJECTIVE BOX
Patient seen and examined at bedside with neurology team and patient denies any acute overnight events and no new neurologic symptoms. She states her balance and gait are improved     MEDICATIONS  (STANDING):  cyanocobalamin 1000 MICROGram(s) Oral daily  enoxaparin Injectable 40 milliGRAM(s) SubCutaneous daily  FLUoxetine 20 milliGRAM(s) Oral daily  folic acid 1 milliGRAM(s) Oral daily  influenza   Vaccine 0.5 milliLiter(s) IntraMuscular once  melatonin 6 milliGRAM(s) Oral at bedtime  multivitamin 1 Tablet(s) Oral daily  polyethylene glycol 3350 17 Gram(s) Oral daily  senna 2 Tablet(s) Oral at bedtime  thiamine 100 milliGRAM(s) Oral daily        Vital Signs Last 24 Hrs  T(C): 36.7 (11 Mar 2021 06:04), Max: 37 (10 Mar 2021 15:21)  T(F): 98 (11 Mar 2021 06:04), Max: 98.6 (10 Mar 2021 15:21)  HR: 91 (11 Mar 2021 06:04) (91 - 97)  BP: 131/83 (11 Mar 2021 06:04) (124/66 - 138/72)  BP(mean): --  RR: 18 (11 Mar 2021 06:04) (18 - 18)  SpO2: 97% (11 Mar 2021 06:04) (97% - 99%)        Constitutional: awake and alert.  HEENT: PERRLA, EOMI,   Neck: Supple.  Gastrointestinal: soft, nontender  Musculoskeletal: no joint swelling/tenderness, no abnormal movements  Skin: No rashes, dry skin noted     Neurological exam:  Mental status: A x O x 3. Appropriately interactive, normal affect. Speech fluent, No Aphasia or paraphasic errors. Naming /repetition intact   CN: PERRL, EOMI, VFF, facial sensation normal, no NLFD, tongue midline,  Motor: No pronator drift, Strength 4/5 in all lower ext, normal bulk and tone, no tremor, rigidity  Sens: decreased vibration at ankles and toes   Reflexes: Symmetric and normal, BJ 2+, TJ 2+, BR 2+, KJ 1+, AJ 0, downgoing toes b/l  Coord:  No FNFA, dysmetria,  Gait/Balance: Did not test       Labs:                        10.8   7.68  )-----------( 383      ( 11 Mar 2021 07:14 )             32.7     03-11    142  |  109<H>  |  14  ----------------------------<  79  3.5   |  21<L>  |  0.52    Ca    8.6      11 Mar 2021 07:14  Phos  3.7     03-11  Mg     2.0     03-11    TPro  5.1<L>  /  Alb  2.5<L>  /  TBili  0.2  /  DBili  x   /  AST  17  /  ALT  6   /  AlkPhos  63  03-11      03-04 Chol 135 LDL -- HDL 58 Trig 69  PT/INR - ( 10 Mar 2021 07:51 )   PT: 12.5 sec;   INR: 1.09 ratio      CT of Chest and abdomen and pelvis were reviewed and no evidence of masses or malignancy     LP so far non infectious and normal protein and cell count and glucose,   Ig G index normal    OCB and Myelin basic protein are pending

## 2021-03-11 NOTE — DIETITIAN INITIAL EVALUATION ADULT. - PROBLEM SELECTOR PLAN 1
Bilateral LE weakness started 2 months ago, prox > distal, equal bilaterally, no numbness or tingling and no imbalance.   - B12, CK and TSH normal, CRP (40) and ESR (77) elevated  - f/u folate  - start thiamine 500 mg TID x 3 days then 100 mg daily, multivitamin  - PT consult  - neuro consult in am

## 2021-03-11 NOTE — DIETITIAN INITIAL EVALUATION ADULT. - ADD RECOMMEND
1) Monitor weights, PO intake/diet tolerance, skin integrity, pertinent labs. 2) Continue MVI 1x daily and micronutrients Folic acid, thiamine, cyanocobalamin as ordered. 3) Honor food preferences as requested and available.

## 2021-03-11 NOTE — PROGRESS NOTE ADULT - PROBLEM SELECTOR PLAN 8
Diet: DASH/TLC  Dispo: PT eval: rehab facility - accepted to [facility], but cancelled discharge due to MRI findings.  DVT ppx: lovenox

## 2021-03-11 NOTE — DIETITIAN INITIAL EVALUATION ADULT. - ORAL NUTRITION SUPPLEMENTS
May consider Ensure Enlive 240mls 1x daily (350 kcal, 20g protein), however, good PO intake is maintained at this time.
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
## 2021-03-11 NOTE — DIETITIAN INITIAL EVALUATION ADULT. - CHIEF COMPLAINT
65 year old female with ETOH use disorder, depression p/w weakness likely 2/2 vitamin deficiency vs autoimmune.   B12 low/normal, CK and TSH normal, CRP (40) and ESR (77) elevated, high homocysteine (81.2).  Patient is s/p thiamine 500 mg TID x 3 days; continuing on c/w thiamine 100 mg daily, multivitamin, folate and cyanocobalamin 1000 mcg daily, macrocytic anemia.

## 2021-03-11 NOTE — PROGRESS NOTE ADULT - ASSESSMENT
65 year old female with ETOH use disorder, depression p/w weakness likely 2/2 vitamin deficiency vs neuromuscular disease vs autoimmune. 65 year old female with ETOH use disorder, depression p/w weakness likely 2/2 vitamin deficiency vs autoimmune.

## 2021-03-11 NOTE — PROGRESS NOTE ADULT - SUBJECTIVE AND OBJECTIVE BOX
***************************************************************  Nette Carlislemann, PGY1  Internal Medicine   pager: NS: [phone] [facility]: [phone]  ***************************************************************    PROGRESS NOTE:     Patient is a 65y old  Female who presents with a chief complaint of Bilateral LE weakness (proximal > distal) (10 Mar 2021 05:57)      SUBJECTIVE / OVERNIGHT EVENTS:      MEDICATIONS  (STANDING):  cyanocobalamin 1000 MICROGram(s) Oral daily  enoxaparin Injectable 40 milliGRAM(s) SubCutaneous daily  FLUoxetine 20 milliGRAM(s) Oral daily  folic acid 1 milliGRAM(s) Oral daily  influenza   Vaccine 0.5 milliLiter(s) IntraMuscular once  melatonin 6 milliGRAM(s) Oral at bedtime  multivitamin 1 Tablet(s) Oral daily  polyethylene glycol 3350 17 Gram(s) Oral daily  senna 2 Tablet(s) Oral at bedtime  thiamine 100 milliGRAM(s) Oral daily    MEDICATIONS  (PRN):      CAPILLARY BLOOD GLUCOSE        I&O's Summary      PHYSICAL EXAM:  Vital Signs Last 24 Hrs  T(C): 37 (10 Mar 2021 15:21), Max: 37.2 (10 Mar 2021 07:00)  T(F): 98.6 (10 Mar 2021 15:21), Max: 98.9 (10 Mar 2021 07:00)  HR: 97 (10 Mar 2021 15:21) (97 - 97)  BP: 124/66 (10 Mar 2021 15:21) (124/66 - 128/79)  BP(mean): --  RR: 18 (10 Mar 2021 15:21) (16 - 18)  SpO2: 99% (10 Mar 2021 15:21) (96% - 99%)    PHYSICAL EXAM:  GENERAL: NAD, well-groomed, well-developed  HEAD:  Atraumatic, Normocephalic  EYES: (+) xanthelasma. EOMI, PERRLA, conjunctiva and sclera clear.  ENT: No tonsillar erythema, exudates, or enlargement; Moist mucous membranes, Good dentition, No lesions  NECK: Supple, No JVD, Normal thyroid  NERVOUS SYSTEM:  Alert & Oriented X3, Good concentration; Motor Strength 4/5 B/L upper prox, 5/5 wrist and hands. Lower extremities hip flexors 5/5, knee flexors 5/5, ankle flexors 5/5.  Sensation decreased R LE distally>proximally  CHEST/LUNG: Clear to percussion bilaterally; No rales, rhonchi, wheezing, or rubs  HEART: Regular rate and rhythm; No murmurs, rubs, or gallops  ABDOMEN: Soft, Nontender, Nondistended; Bowel sounds present  EXTREMITIES:  2+ Peripheral Pulses, No clubbing, cyanosis, or edema  LYMPH: No lymphadenopathy noted  SKIN: No rashes     LABS:                        10.5   7.54  )-----------( 378      ( 10 Mar 2021 07:51 )             32.3     03-10    140  |  106  |  12  ----------------------------<  75  3.6   |  23  |  0.55    Ca    8.7      10 Mar 2021 07:51  Phos  3.9     03-10  Mg     1.9     03-10    TPro  5.3<L>  /  Alb  2.3<L>  /  TBili  0.2  /  DBili  x   /  AST  14  /  ALT  5   /  AlkPhos  73  03-09    PT/INR - ( 10 Mar 2021 07:51 )   PT: 12.5 sec;   INR: 1.09 ratio                   Culture - Acid Fast - CSF (collected 10 Mar 2021 18:09)  Source: .CSF CSF    Culture - CSF with Gram Stain (collected 10 Mar 2021 18:09)  Source: .CSF CSF  Gram Stain (10 Mar 2021 19:01):    No polymorphonuclear leukocytes seen    No organisms seen    by cytocentrifuge        RADIOLOGY & ADDITIONAL TESTS:  Results Reviewed:   Imaging Personally Reviewed:  Electrocardiogram Personally Reviewed:    COORDINATION OF CARE:  Care Discussed with Consultants/Other Providers [Y/N]:  Prior or Outpatient Records Reviewed [Y/N]:   ***************************************************************  Netteseth Jaime, PGY1  Internal Medicine   pager: NS: [phone] [facility]: [phone]  ***************************************************************    PROGRESS NOTE:     Patient is a 65y old  Female who presents with a chief complaint of Bilateral LE weakness (proximal > distal) (10 Mar 2021 05:57)      SUBJECTIVE / OVERNIGHT EVENTS:  Patient had no acute events overnight. Her strength continues to improve. Her shoulder flexors 4+/5 and hip flexors 5/5. Patient had an LP yesterday and denies headache or pain at the incision site. Results of LP are negative so far including HSV, Gram stain, AF, and 0% neutrophils in the CSF. Awaiting additional results for the CSF culture.    MEDICATIONS  (STANDING):  cyanocobalamin 1000 MICROGram(s) Oral daily  enoxaparin Injectable 40 milliGRAM(s) SubCutaneous daily  FLUoxetine 20 milliGRAM(s) Oral daily  folic acid 1 milliGRAM(s) Oral daily  influenza   Vaccine 0.5 milliLiter(s) IntraMuscular once  melatonin 6 milliGRAM(s) Oral at bedtime  multivitamin 1 Tablet(s) Oral daily  polyethylene glycol 3350 17 Gram(s) Oral daily  senna 2 Tablet(s) Oral at bedtime  thiamine 100 milliGRAM(s) Oral daily    MEDICATIONS  (PRN):      CAPILLARY BLOOD GLUCOSE        I&O's Summary      PHYSICAL EXAM:  Vital Signs Last 24 Hrs  T(C): 37 (10 Mar 2021 15:21), Max: 37.2 (10 Mar 2021 07:00)  T(F): 98.6 (10 Mar 2021 15:21), Max: 98.9 (10 Mar 2021 07:00)  HR: 97 (10 Mar 2021 15:21) (97 - 97)  BP: 124/66 (10 Mar 2021 15:21) (124/66 - 128/79)  BP(mean): --  RR: 18 (10 Mar 2021 15:21) (16 - 18)  SpO2: 99% (10 Mar 2021 15:21) (96% - 99%)    PHYSICAL EXAM:  GENERAL: NAD, well-groomed, well-developed  HEAD:  Atraumatic, Normocephalic  EYES: (+) xanthelasma. EOMI, PERRLA, conjunctiva and sclera clear.  ENT: No tonsillar erythema, exudates, or enlargement; Moist mucous membranes, Good dentition, No lesions  NECK: Supple, No JVD, Normal thyroid  NERVOUS SYSTEM:  Alert & Oriented X3, Good concentration; Motor Strength 4+/5 B/L upper prox, 5/5 wrist and hands. Lower extremities hip flexors 5/5, knee flexors 5/5, ankle flexors 5/5.  Sensation decreased R LE distally>proximally  CHEST/LUNG: Clear to percussion bilaterally; No rales, rhonchi, wheezing, or rubs  HEART: Regular rate and rhythm; No murmurs, rubs, or gallops  ABDOMEN: Soft, Nontender, Nondistended; Bowel sounds present  EXTREMITIES:  2+ Peripheral Pulses, No clubbing, cyanosis, or edema  LYMPH: No lymphadenopathy noted  SKIN: No rashes     LABS:                        10.5   7.54  )-----------( 378      ( 10 Mar 2021 07:51 )             32.3     03-10    140  |  106  |  12  ----------------------------<  75  3.6   |  23  |  0.55    Ca    8.7      10 Mar 2021 07:51  Phos  3.9     03-10  Mg     1.9     03-10    TPro  5.3<L>  /  Alb  2.3<L>  /  TBili  0.2  /  DBili  x   /  AST  14  /  ALT  5   /  AlkPhos  73  03-09    PT/INR - ( 10 Mar 2021 07:51 )   PT: 12.5 sec;   INR: 1.09 ratio                   Culture - Acid Fast - CSF (collected 10 Mar 2021 18:09)  Source: .CSF CSF    Culture - CSF with Gram Stain (collected 10 Mar 2021 18:09)  Source: .CSF CSF  Gram Stain (10 Mar 2021 19:01):    No polymorphonuclear leukocytes seen    No organisms seen    by cytocentrifuge        RADIOLOGY & ADDITIONAL TESTS:  Results Reviewed:   Imaging Personally Reviewed:  Electrocardiogram Personally Reviewed:    COORDINATION OF CARE:  Care Discussed with Consultants/Other Providers [Y/N]:  Prior or Outpatient Records Reviewed [Y/N]:

## 2021-03-12 ENCOUNTER — TRANSCRIPTION ENCOUNTER (OUTPATIENT)
Age: 66
End: 2021-03-12

## 2021-03-12 VITALS
TEMPERATURE: 98 F | DIASTOLIC BLOOD PRESSURE: 76 MMHG | OXYGEN SATURATION: 96 % | RESPIRATION RATE: 18 BRPM | SYSTOLIC BLOOD PRESSURE: 125 MMHG | HEART RATE: 95 BPM

## 2021-03-12 LAB
ACHR MOD AB SER-ACNC: <12 % — SIGNIFICANT CHANGE UP (ref 0–20)
ANION GAP SERPL CALC-SCNC: 11 MMOL/L — SIGNIFICANT CHANGE UP (ref 7–14)
BASOPHILS # BLD AUTO: 0.04 K/UL — SIGNIFICANT CHANGE UP (ref 0–0.2)
BASOPHILS NFR BLD AUTO: 0.6 % — SIGNIFICANT CHANGE UP (ref 0–2)
BUN SERPL-MCNC: 17 MG/DL — SIGNIFICANT CHANGE UP (ref 7–23)
CALCIUM SERPL-MCNC: 8.6 MG/DL — SIGNIFICANT CHANGE UP (ref 8.4–10.5)
CHLORIDE SERPL-SCNC: 107 MMOL/L — SIGNIFICANT CHANGE UP (ref 98–107)
CO2 SERPL-SCNC: 22 MMOL/L — SIGNIFICANT CHANGE UP (ref 22–31)
CREAT SERPL-MCNC: 0.55 MG/DL — SIGNIFICANT CHANGE UP (ref 0.5–1.3)
EOSINOPHIL # BLD AUTO: 0.37 K/UL — SIGNIFICANT CHANGE UP (ref 0–0.5)
EOSINOPHIL NFR BLD AUTO: 5.7 % — SIGNIFICANT CHANGE UP (ref 0–6)
GLUCOSE SERPL-MCNC: 75 MG/DL — SIGNIFICANT CHANGE UP (ref 70–99)
HCT VFR BLD CALC: 32.3 % — LOW (ref 34.5–45)
HGB BLD-MCNC: 10.5 G/DL — LOW (ref 11.5–15.5)
IANC: 3.75 K/UL — SIGNIFICANT CHANGE UP (ref 1.5–8.5)
IMM GRANULOCYTES NFR BLD AUTO: 0.8 % — SIGNIFICANT CHANGE UP (ref 0–1.5)
LYMPHOCYTES # BLD AUTO: 1.8 K/UL — SIGNIFICANT CHANGE UP (ref 1–3.3)
LYMPHOCYTES # BLD AUTO: 27.6 % — SIGNIFICANT CHANGE UP (ref 13–44)
MAGNESIUM SERPL-MCNC: 2.1 MG/DL — SIGNIFICANT CHANGE UP (ref 1.6–2.6)
MBP CSF-MCNC: 8.5 NG/ML — HIGH (ref 0–4.7)
MCHC RBC-ENTMCNC: 32.5 GM/DL — SIGNIFICANT CHANGE UP (ref 32–36)
MCHC RBC-ENTMCNC: 34.7 PG — HIGH (ref 27–34)
MCV RBC AUTO: 106.6 FL — HIGH (ref 80–100)
METHYLMALONATE SERPL-SCNC: 113 NMOL/L — SIGNIFICANT CHANGE UP (ref 0–378)
MONOCYTES # BLD AUTO: 0.52 K/UL — SIGNIFICANT CHANGE UP (ref 0–0.9)
MONOCYTES NFR BLD AUTO: 8 % — SIGNIFICANT CHANGE UP (ref 2–14)
NEUTROPHILS # BLD AUTO: 3.75 K/UL — SIGNIFICANT CHANGE UP (ref 1.8–7.4)
NEUTROPHILS NFR BLD AUTO: 57.3 % — SIGNIFICANT CHANGE UP (ref 43–77)
NRBC # BLD: 0 /100 WBCS — SIGNIFICANT CHANGE UP
NRBC # FLD: 0 K/UL — SIGNIFICANT CHANGE UP
PHOSPHATE SERPL-MCNC: 3.5 MG/DL — SIGNIFICANT CHANGE UP (ref 2.5–4.5)
PLATELET # BLD AUTO: 372 K/UL — SIGNIFICANT CHANGE UP (ref 150–400)
POTASSIUM SERPL-MCNC: 3.6 MMOL/L — SIGNIFICANT CHANGE UP (ref 3.5–5.3)
POTASSIUM SERPL-SCNC: 3.6 MMOL/L — SIGNIFICANT CHANGE UP (ref 3.5–5.3)
RBC # BLD: 3.03 M/UL — LOW (ref 3.8–5.2)
RBC # FLD: 16.1 % — HIGH (ref 10.3–14.5)
SARS-COV-2 RNA SPEC QL NAA+PROBE: SIGNIFICANT CHANGE UP
SODIUM SERPL-SCNC: 140 MMOL/L — SIGNIFICANT CHANGE UP (ref 135–145)
VDRL CSF-TITR: SIGNIFICANT CHANGE UP
WBC # BLD: 6.53 K/UL — SIGNIFICANT CHANGE UP (ref 3.8–10.5)
WBC # FLD AUTO: 6.53 K/UL — SIGNIFICANT CHANGE UP (ref 3.8–10.5)

## 2021-03-12 PROCEDURE — 99238 HOSP IP/OBS DSCHRG MGMT 30/<: CPT | Mod: GC

## 2021-03-12 RX ORDER — ENOXAPARIN SODIUM 100 MG/ML
40 INJECTION SUBCUTANEOUS
Qty: 0 | Refills: 0 | DISCHARGE
Start: 2021-03-12

## 2021-03-12 RX ORDER — THIAMINE MONONITRATE (VIT B1) 100 MG
1 TABLET ORAL
Qty: 0 | Refills: 0 | DISCHARGE
Start: 2021-03-12

## 2021-03-12 RX ORDER — FOLIC ACID 0.8 MG
1 TABLET ORAL
Qty: 0 | Refills: 0 | DISCHARGE
Start: 2021-03-12

## 2021-03-12 RX ORDER — PREGABALIN 225 MG/1
1 CAPSULE ORAL
Qty: 0 | Refills: 0 | DISCHARGE
Start: 2021-03-12

## 2021-03-12 RX ORDER — POLYETHYLENE GLYCOL 3350 17 G/17G
17 POWDER, FOR SOLUTION ORAL
Qty: 0 | Refills: 0 | DISCHARGE
Start: 2021-03-12

## 2021-03-12 RX ORDER — SENNA PLUS 8.6 MG/1
2 TABLET ORAL
Qty: 0 | Refills: 0 | DISCHARGE
Start: 2021-03-12

## 2021-03-12 RX ADMIN — Medication 1 MILLIGRAM(S): at 12:05

## 2021-03-12 RX ADMIN — Medication 100 MILLIGRAM(S): at 12:05

## 2021-03-12 RX ADMIN — PREGABALIN 1000 MICROGRAM(S): 225 CAPSULE ORAL at 12:05

## 2021-03-12 RX ADMIN — ENOXAPARIN SODIUM 40 MILLIGRAM(S): 100 INJECTION SUBCUTANEOUS at 12:05

## 2021-03-12 RX ADMIN — Medication 20 MILLIGRAM(S): at 12:05

## 2021-03-12 NOTE — PROGRESS NOTE ADULT - PROBLEM/PLAN-3
Outpatient Medications Marked as Taking for the 12/14/20 encounter (Refill) with Merlin Esteves MD   Medication Sig Dispense Refill   â¢ amphetamine-dextroamphetamine (Adderall) 20 MG tablet Take 2 tablets by mouth daily at 11am 60 tablet 0        Ok to leave detailed Message: Yes  Informed caller of refill policy- 13-61 hours: Yes  No call back needed unless nurse has questions.      Pharmacy: [facility]
Patient calling for refill of adderall 20mg - last rx was sent to pharmacy on 12/1/2020 - too early to refill at that time. Called pharmacy, they have rx on file and earliest refill date is today.
DISPLAY PLAN FREE TEXT

## 2021-03-12 NOTE — DISCHARGE NOTE PROVIDER - NSFOLLOWUPCLINICS_GEN_ALL_ED_FT
[facility] General Internal Medicine  General Internal Medicine  [address] [id number]  Phone: [phone]  Fax:   Follow Up Time: 2 weeks

## 2021-03-12 NOTE — PROGRESS NOTE ADULT - PROBLEM SELECTOR PROBLEM 3
Major depression

## 2021-03-12 NOTE — PROGRESS NOTE ADULT - PROBLEM SELECTOR PLAN 2
c/w home prozac

## 2021-03-12 NOTE — DISCHARGE NOTE PROVIDER - HOSPITAL COURSE
65 year old female with ETOH use disorder, depression p/w weakness. Bilateral lower extremity weakness started 2 months ago, proximal > distal, equal bilaterally, no numbess or tingling and denies imbalance or muscular pain. She had a fall 2 months ago when she was drinking and went to a rehab facility voluntarily two weeks ago for anxiety, depression and alcohol use disorder. Her last drink was 5 weeks ago. this morning, she checked herself out of rehab because she was not able to participate in the activities due to LE weakness and the rehab dropped her in the ED. She denies f/c SOB, back pain, abdominal pain, increased urinary frequency. She endorses some bowel incontinence which has since resolved. Recently she has been requiring a wheelchair. She denies headache, blurry vision, neck or back pain, UE weakness or numbness, f/c, cough, SOB.     VS: afebrile, , -170/, RR 16, SpO2 100% RA  s/p 2L LR, thiamine 100mg, multivitamin, folate 1 mg 65 year old female with ETOH use disorder, depression and anxiety p/w weakness. Bilateral lower extremity and upper extremity weakness started 2 months prior to admission, proximal > distal, equal bilaterally, no numbess or tingling and denied imbalance or muscular pain. She had a fall 2 months ago when she was drinking and went to a rehab facility voluntarily two weeks ago to be treated for anxiety, depression and alcohol use disorder. Her last drink was 5 weeks prior to admission. The morning of admission, patient checked herself out of rehab because she was not able to participate in the activities due to LE weakness and the rehab dropped her in the ED. She denied f/c, SOB, back pain, abdominal pain, and increased urinary frequency. She endorsed some bowel incontinence which has since resolved. Recently she has been requiring a wheelchair. She denied headache, blurry vision, neck or back pain, UE weakness or numbness, f/c, cough, SOB. In the ED, she was afebrile, , -170/, RR 16, SpO2 100% on RA. In the ED, she was given 2 L LR, thiamine 100mg, MV, and folate 1 mg.     When admitted, she was started on thiamine 500 mg TID x 3 days and then switched to 100mg daily thereafter. She was also started on a multivitamin. She was found to have macrocytic anemia with hgb 10.5 and .5 on admission with low/normal B12 (260) and low folate (3.0). Homocysteine was high and MMA is still pending. She was started on cyanocobalamin 1,000 mcg daily and folate 1 mg daily. Neuro was consulted and recommended MRI brain and spine, labs for autoimmune and vitamin/mineral levels. Each day, she endorsed improved strength and her motor neuro exam improved daily. She had a number of PT sessions and continued to have improved ambulation with her walker. MRI (3/9) showed degenerative changes with multiple bulging discs with concern for MS or metastatic disease. CT a/p was negative for an infectious workup. An LP was performed. Thus far, serum labs for autoimmune pathology, infectious pathology or neoplastic pathology has been negative. Still pending are MS CSF results. However, given patient's clinical improvement with vitamin therapy, neurology signed off and will follow up with patient outpatient.    Lipid panel was wnl, and A1c 5.6, performed due to patient's obesity with BMI 33. During the hospital stay, podiatry performed nail care for treatment of her toenails. Patient endorsed wanting to quit drinking alcohol, and being committed to this decision.     Patient now has 5/5 motor strength in both hip flexors and shoulder flexors. Patient is medically optimized and HD stable and ready to be discharged to an acute rehab facility. She received the [employer] COVID vaccine prior to discharge.       65 year old female with ETOH use disorder, depression and anxiety p/w weakness. Bilateral lower extremity and upper extremity weakness started 2 months prior to admission, proximal > distal, equal bilaterally, no numbess or tingling and denied imbalance or muscular pain. She had a fall 2 months ago when she was drinking and went to a rehab facility voluntarily two weeks ago to be treated for anxiety, depression and alcohol use disorder. Her last drink was 5 weeks prior to admission. The morning of admission, patient checked herself out of rehab because she was not able to participate in the activities due to LE weakness and the rehab dropped her in the ED. She denied f/c, SOB, back pain, abdominal pain, and increased urinary frequency. She endorsed some bowel incontinence which has since resolved. Recently she has been requiring a wheelchair. She denied headache, blurry vision, neck or back pain, UE weakness or numbness, f/c, cough, SOB. In the ED, she was afebrile, , -170/, RR 16, SpO2 100% on RA. In the ED, she was given 2 L LR, thiamine 100mg, MV, and folate 1 mg.     When admitted, she was started on thiamine 500 mg TID x 3 days and then switched to 100mg daily thereafter. She was also started on a multivitamin. She was found to have macrocytic anemia with hgb 10.5 and .5 on admission with low/normal B12 (260) and low folate (3.0). Homocysteine was high and MMA is still pending. She was started on cyanocobalamin 1,000 mcg daily and folate 1 mg daily. Neuro was consulted and recommended MRI brain and spine, labs for autoimmune and vitamin/mineral levels. Each day, she endorsed improved strength and her motor neuro exam improved daily. She had a number of PT sessions and continued to have improved ambulation with her walker. MRI (3/9) showed degenerative changes with multiple bulging discs with concern for MS or metastatic disease. CT a/p was negative for an neoplastic workup. An LP was performed. Thus far, serum labs for autoimmune pathology, infectious pathology or neoplastic pathology have been negative. Still pending are MS CSF results. However, given patient's clinical improvement with vitamin therapy, neurology signed off and will follow up with patient outpatient.    Lipid panel was wnl, and A1c 5.6, performed due to patient's obesity with BMI 33. During the hospital stay, podiatry performed nail care for treatment of her toenails. Patient endorsed wanting to quit drinking alcohol, and being committed to this decision.     Patient now has 5/5 motor strength in both hip flexors and shoulder flexors. Patient is medically optimized and HD stable and ready to be discharged to an acute rehab facility. She received the [employer] COVID vaccine prior to discharge.

## 2021-03-12 NOTE — DISCHARGE NOTE PROVIDER - NSDCMRMEDTOKEN_GEN_ALL_CORE_FT
melatonin 5 mg oral capsule: 1 cap(s) orally once a day (at bedtime)  PROzac 20 mg oral capsule: 1 cap(s) orally once a day   cyanocobalamin 1000 mcg oral tablet: 1 tab(s) orally once a day  enoxaparin: 40 milligram(s) subcutaneous once a day  folic acid 1 mg oral tablet: 1 tab(s) orally once a day  melatonin 5 mg oral capsule: 1 cap(s) orally once a day (at bedtime)  Multiple Vitamins oral tablet: 1 tab(s) orally once a day  polyethylene glycol 3350 oral powder for reconstitution: 17 gram(s) orally once a day  PROzac 20 mg oral capsule: 1 cap(s) orally once a day  senna oral tablet: 2 tab(s) orally once a day (at bedtime)  thiamine 100 mg oral tablet: 1 tab(s) orally once a day

## 2021-03-12 NOTE — PROGRESS NOTE ADULT - SUBJECTIVE AND OBJECTIVE BOX
***************************************************************  Nette Jaime, PGY1  Internal Medicine   pager: NS: [phone] [facility]: [phone]  ***************************************************************    PROGRESS NOTE:     Patient is a 65y old  Female who presents with a chief complaint of Bilateral LE weakness (proximal > distal) (11 Mar 2021 16:00)      SUBJECTIVE / OVERNIGHT EVENTS:      MEDICATIONS  (STANDING):  cyanocobalamin 1000 MICROGram(s) Oral daily  enoxaparin Injectable 40 milliGRAM(s) SubCutaneous daily  FLUoxetine 20 milliGRAM(s) Oral daily  folic acid 1 milliGRAM(s) Oral daily  influenza   Vaccine 0.5 milliLiter(s) IntraMuscular once  melatonin 6 milliGRAM(s) Oral at bedtime  multivitamin 1 Tablet(s) Oral daily  polyethylene glycol 3350 17 Gram(s) Oral daily  senna 2 Tablet(s) Oral at bedtime  thiamine 100 milliGRAM(s) Oral daily    MEDICATIONS  (PRN):      CAPILLARY BLOOD GLUCOSE        I&O's Summary    11 Mar 2021 07:01  -  12 Mar 2021 06:05  --------------------------------------------------------  IN: 0 mL / OUT: 300 mL / NET: -300 mL        PHYSICAL EXAM:  Vital Signs Last 24 Hrs  T(C): 36.7 (12 Mar 2021 05:39), Max: 36.9 (11 Mar 2021 23:00)  T(F): 98 (12 Mar 2021 05:39), Max: 98.4 (11 Mar 2021 23:00)  HR: 92 (12 Mar 2021 05:39) (92 - 100)  BP: 138/76 (12 Mar 2021 05:39) (124/68 - 142/87)  BP(mean): --  RR: 18 (12 Mar 2021 05:39) (18 - 19)  SpO2: 96% (12 Mar 2021 05:39) (96% - 100%)    PHYSICAL EXAM:  GENERAL: NAD, well-groomed, well-developed  HEAD:  Atraumatic, Normocephalic  EYES: (+) xanthelasma. EOMI, PERRLA, conjunctiva and sclera clear.  ENT: No tonsillar erythema, exudates, or enlargement; Moist mucous membranes, Good dentition, No lesions  NECK: Supple, No JVD, Normal thyroid  NERVOUS SYSTEM:  Alert & Oriented X3, Good concentration; Motor Strength 4+/5 B/L upper prox, 5/5 wrist and hands. Lower extremities hip flexors 5/5, knee flexors 5/5, ankle flexors 5/5.  Sensation decreased R LE distally>proximally  CHEST/LUNG: Clear to percussion bilaterally; No rales, rhonchi, wheezing, or rubs  HEART: Regular rate and rhythm; No murmurs, rubs, or gallops  ABDOMEN: Soft, Nontender, Nondistended; Bowel sounds present  EXTREMITIES:  2+ Peripheral Pulses, No clubbing, cyanosis, or edema  LYMPH: No lymphadenopathy noted  SKIN: No rashes       LABS:                        10.8   7.68  )-----------( 383      ( 11 Mar 2021 07:14 )             32.7     03-11    142  |  109<H>  |  14  ----------------------------<  79  3.5   |  21<L>  |  0.52    Ca    8.6      11 Mar 2021 07:14  Phos  3.7     03-11  Mg     2.0     03-11    TPro  5.1<L>  /  Alb  2.5<L>  /  TBili  0.2  /  DBili  x   /  AST  17  /  ALT  6   /  AlkPhos  63  03-11    PT/INR - ( 10 Mar 2021 07:51 )   PT: 12.5 sec;   INR: 1.09 ratio                   Culture - Acid Fast - CSF (collected 10 Mar 2021 18:09)  Source: .CSF CSF    Culture - CSF with Gram Stain (collected 10 Mar 2021 18:09)  Source: .CSF CSF  Gram Stain (10 Mar 2021 19:01):    No polymorphonuclear leukocytes seen    No organisms seen    by cytocentrifuge  Preliminary Report (11 Mar 2021 15:54):    No growth        RADIOLOGY & ADDITIONAL TESTS:  Results Reviewed:   Imaging Personally Reviewed:  Electrocardiogram Personally Reviewed:    COORDINATION OF CARE:  Care Discussed with Consultants/Other Providers [Y/N]:  Prior or Outpatient Records Reviewed [Y/N]:   ***************************************************************  Nette Jaime, PGY1  Internal Medicine   pager: NS: [phone] [facility]: [phone]  ***************************************************************    PROGRESS NOTE:     Patient is a 65y old  Female who presents with a chief complaint of Bilateral LE weakness (proximal > distal) (11 Mar 2021 16:00)      SUBJECTIVE / OVERNIGHT EVENTS:  Patient had no acute events overnight. She is feeling well and has increasing strength in both her upper and lower extremities. Neurology workup is so far negative. Neurology will follow up with patient outpatient remaining labs.    MEDICATIONS  (STANDING):  cyanocobalamin 1000 MICROGram(s) Oral daily  enoxaparin Injectable 40 milliGRAM(s) SubCutaneous daily  FLUoxetine 20 milliGRAM(s) Oral daily  folic acid 1 milliGRAM(s) Oral daily  influenza   Vaccine 0.5 milliLiter(s) IntraMuscular once  melatonin 6 milliGRAM(s) Oral at bedtime  multivitamin 1 Tablet(s) Oral daily  polyethylene glycol 3350 17 Gram(s) Oral daily  senna 2 Tablet(s) Oral at bedtime  thiamine 100 milliGRAM(s) Oral daily    MEDICATIONS  (PRN):      CAPILLARY BLOOD GLUCOSE        I&O's Summary    11 Mar 2021 07:01  -  12 Mar 2021 06:05  --------------------------------------------------------  IN: 0 mL / OUT: 300 mL / NET: -300 mL        PHYSICAL EXAM:  Vital Signs Last 24 Hrs  T(C): 36.7 (12 Mar 2021 05:39), Max: 36.9 (11 Mar 2021 23:00)  T(F): 98 (12 Mar 2021 05:39), Max: 98.4 (11 Mar 2021 23:00)  HR: 92 (12 Mar 2021 05:39) (92 - 100)  BP: 138/76 (12 Mar 2021 05:39) (124/68 - 142/87)  BP(mean): --  RR: 18 (12 Mar 2021 05:39) (18 - 19)  SpO2: 96% (12 Mar 2021 05:39) (96% - 100%)    PHYSICAL EXAM:  GENERAL: NAD, well-groomed, well-developed  HEAD:  Atraumatic, Normocephalic  EYES: (+) xanthelasma. EOMI, PERRLA, conjunctiva and sclera clear.  ENT: No tonsillar erythema, exudates, or enlargement; Moist mucous membranes, Good dentition, No lesions  NECK: Supple, No JVD, Normal thyroid  NERVOUS SYSTEM:  Alert & Oriented X3, Good concentration; Motor Strength 4+/5 B/L upper prox, 5/5 wrist and hands. Lower extremities hip flexors 5/5, knee flexors 5/5, ankle flexors 5/5.  Sensation decreased R LE distally>proximally  CHEST/LUNG: Clear to percussion bilaterally; No rales, rhonchi, wheezing, or rubs  HEART: Regular rate and rhythm; No murmurs, rubs, or gallops  ABDOMEN: Soft, Nontender, Nondistended; Bowel sounds present  EXTREMITIES:  2+ Peripheral Pulses, No clubbing, cyanosis, or edema  LYMPH: No lymphadenopathy noted  SKIN: No rashes       LABS:                        10.8   7.68  )-----------( 383      ( 11 Mar 2021 07:14 )             32.7     03-11    142  |  109<H>  |  14  ----------------------------<  79  3.5   |  21<L>  |  0.52    Ca    8.6      11 Mar 2021 07:14  Phos  3.7     03-11  Mg     2.0     03-11    TPro  5.1<L>  /  Alb  2.5<L>  /  TBili  0.2  /  DBili  x   /  AST  17  /  ALT  6   /  AlkPhos  63  03-11    PT/INR - ( 10 Mar 2021 07:51 )   PT: 12.5 sec;   INR: 1.09 ratio                   Culture - Acid Fast - CSF (collected 10 Mar 2021 18:09)  Source: .CSF CSF    Culture - CSF with Gram Stain (collected 10 Mar 2021 18:09)  Source: .CSF CSF  Gram Stain (10 Mar 2021 19:01):    No polymorphonuclear leukocytes seen    No organisms seen    by cytocentrifuge  Preliminary Report (11 Mar 2021 15:54):    No growth        RADIOLOGY & ADDITIONAL TESTS:  Results Reviewed:   Imaging Personally Reviewed:  Electrocardiogram Personally Reviewed:    COORDINATION OF CARE:  Care Discussed with Consultants/Other Providers [Y/N]:  Prior or Outpatient Records Reviewed [Y/N]:   ***************************************************************  Nette Jaime, PGY1  Internal Medicine   pager: NS: [phone] [facility]: [phone]  ***************************************************************    PROGRESS NOTE:     Patient is a 65y old  Female who presents with a chief complaint of Bilateral LE weakness (proximal > distal) (11 Mar 2021 16:00)      SUBJECTIVE / OVERNIGHT EVENTS:  Patient had no acute events overnight. She is feeling well and has increasing strength in both her upper and lower extremities. Neurology workup is so far negative. Neurology will follow up with patient outpatient remaining labs. Pending placement in rehabilitation facility.    MEDICATIONS  (STANDING):  cyanocobalamin 1000 MICROGram(s) Oral daily  enoxaparin Injectable 40 milliGRAM(s) SubCutaneous daily  FLUoxetine 20 milliGRAM(s) Oral daily  folic acid 1 milliGRAM(s) Oral daily  influenza   Vaccine 0.5 milliLiter(s) IntraMuscular once  melatonin 6 milliGRAM(s) Oral at bedtime  multivitamin 1 Tablet(s) Oral daily  polyethylene glycol 3350 17 Gram(s) Oral daily  senna 2 Tablet(s) Oral at bedtime  thiamine 100 milliGRAM(s) Oral daily    MEDICATIONS  (PRN):      CAPILLARY BLOOD GLUCOSE        I&O's Summary    11 Mar 2021 07:01  -  12 Mar 2021 06:05  --------------------------------------------------------  IN: 0 mL / OUT: 300 mL / NET: -300 mL        PHYSICAL EXAM:  Vital Signs Last 24 Hrs  T(C): 36.7 (12 Mar 2021 05:39), Max: 36.9 (11 Mar 2021 23:00)  T(F): 98 (12 Mar 2021 05:39), Max: 98.4 (11 Mar 2021 23:00)  HR: 92 (12 Mar 2021 05:39) (92 - 100)  BP: 138/76 (12 Mar 2021 05:39) (124/68 - 142/87)  BP(mean): --  RR: 18 (12 Mar 2021 05:39) (18 - 19)  SpO2: 96% (12 Mar 2021 05:39) (96% - 100%)    PHYSICAL EXAM:  GENERAL: NAD, well-groomed, well-developed  HEAD:  Atraumatic, Normocephalic  EYES: (+) xanthelasma. EOMI, PERRLA, conjunctiva and sclera clear.  ENT: No tonsillar erythema, exudates, or enlargement; Moist mucous membranes, Good dentition, No lesions  NECK: Supple, No JVD, Normal thyroid  NERVOUS SYSTEM:  Alert & Oriented X3, Good concentration; Motor Strength 4+/5 B/L upper prox, 5/5 wrist and hands. Lower extremities hip flexors 5/5, knee flexors 5/5, ankle flexors 5/5.  Sensation decreased R LE distally>proximally  CHEST/LUNG: Clear to percussion bilaterally; No rales, rhonchi, wheezing, or rubs  HEART: Regular rate and rhythm; No murmurs, rubs, or gallops  ABDOMEN: Soft, Nontender, Nondistended; Bowel sounds present  EXTREMITIES:  2+ Peripheral Pulses, No clubbing, cyanosis, or edema  LYMPH: No lymphadenopathy noted  SKIN: No rashes       LABS:                        10.8   7.68  )-----------( 383      ( 11 Mar 2021 07:14 )             32.7     03-11    142  |  109<H>  |  14  ----------------------------<  79  3.5   |  21<L>  |  0.52    Ca    8.6      11 Mar 2021 07:14  Phos  3.7     03-11  Mg     2.0     03-11    TPro  5.1<L>  /  Alb  2.5<L>  /  TBili  0.2  /  DBili  x   /  AST  17  /  ALT  6   /  AlkPhos  63  03-11    PT/INR - ( 10 Mar 2021 07:51 )   PT: 12.5 sec;   INR: 1.09 ratio                   Culture - Acid Fast - CSF (collected 10 Mar 2021 18:09)  Source: .CSF CSF    Culture - CSF with Gram Stain (collected 10 Mar 2021 18:09)  Source: .CSF CSF  Gram Stain (10 Mar 2021 19:01):    No polymorphonuclear leukocytes seen    No organisms seen    by cytocentrifuge  Preliminary Report (11 Mar 2021 15:54):    No growth        RADIOLOGY & ADDITIONAL TESTS:  Results Reviewed:   Imaging Personally Reviewed:  Electrocardiogram Personally Reviewed:    COORDINATION OF CARE:  Care Discussed with Consultants/Other Providers [Y/N]:  Prior or Outpatient Records Reviewed [Y/N]:

## 2021-03-12 NOTE — PROGRESS NOTE ADULT - REASON FOR ADMISSION
Bilateral LE weakness (proximal > distal)

## 2021-03-12 NOTE — DISCHARGE NOTE PROVIDER - NSDCCPCAREPLAN_GEN_ALL_CORE_FT
PRINCIPAL DISCHARGE DIAGNOSIS  Diagnosis: Weakness  Assessment and Plan of Treatment: When you arrived in the hospital you were weak in both legs and in both arms. You were unable to walk. You were found to have a folate deficiency and low/normal B12. You were started on high dose thiamine for 3 days and continued on thiamine at a lower daily dose; you were, started on B12, folate and a multivitamin and your strength improved. Often patient who drink alcohol can have vitamin depletion from poor oral intake. Please continue to take your vitamins as prescribed and follow up with your primary care provider within 2 weeks of discharge from the hospital. We counselled you about the effects of alcohol use, including liver disease, and malnutrition. You endorsed wanting to quit alcohol and you were highly encouraged you to do so. Please make an appointment to establish care with [facility]'s Internal Medicine practice by calling [phone].

## 2021-03-12 NOTE — PROGRESS NOTE ADULT - PROVIDER SPECIALTY LIST ADULT
Neurology
Podiatry
Internal Medicine
Internal Medicine
Neurology
Internal Medicine

## 2021-03-12 NOTE — PROGRESS NOTE ADULT - PROBLEM SELECTOR PLAN 8
Diet: DASH/TLC  Dispo: PT eval: rehab facility - accepted to [facility], but cancelled discharge due to MRI findings.  DVT ppx: lovenox Diet: DASH/TLC  Dispo: PT eval: rehab facility - accepted to [facility], but cancelled discharge due to MRI findings. Medically stable and ready to be discharged, pending placement. Patient wants COVID vaccine prior to placement.  DVT ppx: lovenox

## 2021-03-12 NOTE — DISCHARGE NOTE PROVIDER - NSDCFUADDAPPT_GEN_ALL_CORE_FT
Please make a follow up appointment with neurology within 2 weeks of being discharged to follow up on your inpatient labs and resolution of symptoms by calling [phone] with either Dr. Reynolds or Dr. Guilherme Barksdale. Please make a follow up appointment with neurology within 2 weeks of being discharged to follow up on your inpatient labs and resolution of symptoms by calling [phone] with either Dr. Reynolds or Dr. Guilherme Barksdale.    Please make an appointment to establish care with [facility] Internal Medicine practice by calling [phone].

## 2021-03-12 NOTE — DISCHARGE NOTE PROVIDER - CARE PROVIDER_API CALL
Wilbert Barksdale; PhD)  Neurology  [address] [place] [address], [address]  [place] [address]  Phone: [phone]  Fax: [phone]  Established Patient  Follow Up Time: 2 weeks

## 2021-03-12 NOTE — PROGRESS NOTE ADULT - ASSESSMENT
65 year old female with ETOH use disorder, depression p/w weakness likely 2/2 vitamin deficiency vs autoimmune.

## 2021-03-13 LAB
CULTURE RESULTS: NO GROWTH — SIGNIFICANT CHANGE UP
FUNGITELL: <31 PG/ML — SIGNIFICANT CHANGE UP
SPECIMEN SOURCE: SIGNIFICANT CHANGE UP

## 2021-03-15 LAB — OLIGOCLONAL BANDS CSF ELPH-IMP: SIGNIFICANT CHANGE UP

## 2021-03-16 LAB — B BURGDOR DNA SPEC QL NAA+PROBE: NEGATIVE — SIGNIFICANT CHANGE UP

## 2021-03-21 LAB — PARANEOPLASTIC AB PNL SER: SIGNIFICANT CHANGE UP

## 2021-03-24 PROBLEM — Z00.00 ENCOUNTER FOR PREVENTIVE HEALTH EXAMINATION: Status: ACTIVE | Noted: 2021-03-24

## 2021-05-01 LAB
CULTURE RESULTS: SIGNIFICANT CHANGE UP
SPECIMEN SOURCE: SIGNIFICANT CHANGE UP

## 2021-05-21 PROBLEM — F32.9 MAJOR DEPRESSIVE DISORDER, SINGLE EPISODE, UNSPECIFIED: Chronic | Status: ACTIVE | Noted: 2021-03-03

## 2021-05-21 PROBLEM — F41.9 ANXIETY DISORDER, UNSPECIFIED: Chronic | Status: ACTIVE | Noted: 2021-03-03

## 2021-05-21 PROBLEM — Z87.898 PERSONAL HISTORY OF OTHER SPECIFIED CONDITIONS: Chronic | Status: ACTIVE | Noted: 2021-03-03

## 2021-07-15 ENCOUNTER — APPOINTMENT (OUTPATIENT)
Dept: NEUROLOGY | Facility: CLINIC | Age: 66
End: 2021-07-15

## 2021-10-01 NOTE — PROGRESS NOTE ADULT - ATTENDING COMMENTS
Okay good. 65 y.o. F w/ a hx of Etoh abuse now quit , MDD who presents with weakness, MRI lesions c/f metastatic dx v demyelinating dx, B12/Folate deficiency.   Patient feels strength is improving, is still interested in rehab.     # Weakness: Weakness improving. F/u LP results, f/u Neuro OP. PT recc for rehab. Cont B12/folate supplementation  Patient medically stable for discharge to [facility].     I have personally seen, examined and participated in the care of this patient. I have reviewed all pertinent clinical information, including history, physical exam, plan and the resident's note and agree except as noted.

## 2022-07-14 ENCOUNTER — TRANSCRIPTION ENCOUNTER (OUTPATIENT)
Age: 67
End: 2022-07-14
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
## 2025-05-28 NOTE — DIETITIAN INITIAL EVALUATION ADULT. - PROBLEM SELECTOR PLAN 2
Patient requests all Lab, Cardiology, and Radiology Results on their Discharge Instructions
c/w home prozac